# Patient Record
Sex: FEMALE | Race: ASIAN | URBAN - METROPOLITAN AREA
[De-identification: names, ages, dates, MRNs, and addresses within clinical notes are randomized per-mention and may not be internally consistent; named-entity substitution may affect disease eponyms.]

---

## 2023-07-05 ENCOUNTER — EMERGENCY (EMERGENCY)
Facility: HOSPITAL | Age: 48
LOS: 0 days | Discharge: HOME | End: 2023-07-05
Attending: EMERGENCY MEDICINE
Payer: MEDICAID

## 2023-07-05 VITALS
RESPIRATION RATE: 18 BRPM | TEMPERATURE: 98 F | OXYGEN SATURATION: 100 % | SYSTOLIC BLOOD PRESSURE: 145 MMHG | HEART RATE: 76 BPM | DIASTOLIC BLOOD PRESSURE: 88 MMHG

## 2023-07-05 VITALS
RESPIRATION RATE: 18 BRPM | SYSTOLIC BLOOD PRESSURE: 137 MMHG | DIASTOLIC BLOOD PRESSURE: 73 MMHG | TEMPERATURE: 97 F | OXYGEN SATURATION: 100 % | HEART RATE: 91 BPM

## 2023-07-05 DIAGNOSIS — E87.6 HYPOKALEMIA: ICD-10-CM

## 2023-07-05 DIAGNOSIS — Z86.79 PERSONAL HISTORY OF OTHER DISEASES OF THE CIRCULATORY SYSTEM: ICD-10-CM

## 2023-07-05 DIAGNOSIS — R42 DIZZINESS AND GIDDINESS: ICD-10-CM

## 2023-07-05 DIAGNOSIS — R20.0 ANESTHESIA OF SKIN: ICD-10-CM

## 2023-07-05 DIAGNOSIS — R00.2 PALPITATIONS: ICD-10-CM

## 2023-07-05 DIAGNOSIS — I10 ESSENTIAL (PRIMARY) HYPERTENSION: ICD-10-CM

## 2023-07-05 LAB
ALBUMIN SERPL ELPH-MCNC: 3.9 G/DL — SIGNIFICANT CHANGE UP (ref 3.3–5)
ALP SERPL-CCNC: 62 U/L — SIGNIFICANT CHANGE UP (ref 40–120)
ALT FLD-CCNC: 26 U/L — SIGNIFICANT CHANGE UP (ref 12–78)
ANION GAP SERPL CALC-SCNC: 9 MMOL/L — SIGNIFICANT CHANGE UP (ref 5–17)
AST SERPL-CCNC: 18 U/L — SIGNIFICANT CHANGE UP (ref 15–37)
BASOPHILS # BLD AUTO: 0.07 K/UL — SIGNIFICANT CHANGE UP (ref 0–0.2)
BASOPHILS NFR BLD AUTO: 1 % — SIGNIFICANT CHANGE UP (ref 0–2)
BILIRUB SERPL-MCNC: 1.1 MG/DL — SIGNIFICANT CHANGE UP (ref 0.2–1.2)
BUN SERPL-MCNC: 13 MG/DL — SIGNIFICANT CHANGE UP (ref 7–23)
CALCIUM SERPL-MCNC: 9.5 MG/DL — SIGNIFICANT CHANGE UP (ref 8.5–10.1)
CHLORIDE SERPL-SCNC: 109 MMOL/L — HIGH (ref 96–108)
CO2 SERPL-SCNC: 20 MMOL/L — LOW (ref 22–31)
CREAT SERPL-MCNC: 1.05 MG/DL — SIGNIFICANT CHANGE UP (ref 0.5–1.3)
EGFR: 65 ML/MIN/1.73M2 — SIGNIFICANT CHANGE UP
EOSINOPHIL # BLD AUTO: 0.07 K/UL — SIGNIFICANT CHANGE UP (ref 0–0.5)
EOSINOPHIL NFR BLD AUTO: 1 % — SIGNIFICANT CHANGE UP (ref 0–6)
GLUCOSE SERPL-MCNC: 113 MG/DL — HIGH (ref 70–99)
HCT VFR BLD CALC: 38.8 % — SIGNIFICANT CHANGE UP (ref 34.5–45)
HGB BLD-MCNC: 13.7 G/DL — SIGNIFICANT CHANGE UP (ref 11.5–15.5)
LYMPHOCYTES # BLD AUTO: 2.57 K/UL — SIGNIFICANT CHANGE UP (ref 1–3.3)
LYMPHOCYTES # BLD AUTO: 38 % — SIGNIFICANT CHANGE UP (ref 13–44)
MAGNESIUM SERPL-MCNC: 2 MG/DL — SIGNIFICANT CHANGE UP (ref 1.6–2.6)
MANUAL SMEAR VERIFICATION: SIGNIFICANT CHANGE UP
MCHC RBC-ENTMCNC: 27.8 PG — SIGNIFICANT CHANGE UP (ref 27–34)
MCHC RBC-ENTMCNC: 35.3 GM/DL — SIGNIFICANT CHANGE UP (ref 32–36)
MCV RBC AUTO: 78.7 FL — LOW (ref 80–100)
MICROCYTES BLD QL: SLIGHT — SIGNIFICANT CHANGE UP
MONOCYTES # BLD AUTO: 0.27 K/UL — SIGNIFICANT CHANGE UP (ref 0–0.9)
MONOCYTES NFR BLD AUTO: 4 % — SIGNIFICANT CHANGE UP (ref 2–14)
NEUTROPHILS # BLD AUTO: 3.79 K/UL — SIGNIFICANT CHANGE UP (ref 1.8–7.4)
NEUTROPHILS NFR BLD AUTO: 56 % — SIGNIFICANT CHANGE UP (ref 43–77)
NRBC # BLD: 0 /100 — SIGNIFICANT CHANGE UP (ref 0–0)
NRBC # BLD: SIGNIFICANT CHANGE UP /100 WBCS (ref 0–0)
PLAT MORPH BLD: NORMAL — SIGNIFICANT CHANGE UP
PLATELET # BLD AUTO: 277 K/UL — SIGNIFICANT CHANGE UP (ref 150–400)
POTASSIUM SERPL-MCNC: 3 MMOL/L — LOW (ref 3.5–5.3)
POTASSIUM SERPL-SCNC: 3 MMOL/L — LOW (ref 3.5–5.3)
PROT SERPL-MCNC: 8.5 GM/DL — HIGH (ref 6–8.3)
RBC # BLD: 4.93 M/UL — SIGNIFICANT CHANGE UP (ref 3.8–5.2)
RBC # FLD: 12.9 % — SIGNIFICANT CHANGE UP (ref 10.3–14.5)
RBC BLD AUTO: ABNORMAL
SODIUM SERPL-SCNC: 138 MMOL/L — SIGNIFICANT CHANGE UP (ref 135–145)
TROPONIN I, HIGH SENSITIVITY RESULT: 4.72 NG/L — SIGNIFICANT CHANGE UP
WBC # BLD: 6.76 K/UL — SIGNIFICANT CHANGE UP (ref 3.8–10.5)
WBC # FLD AUTO: 6.76 K/UL — SIGNIFICANT CHANGE UP (ref 3.8–10.5)

## 2023-07-05 PROCEDURE — 84484 ASSAY OF TROPONIN QUANT: CPT

## 2023-07-05 PROCEDURE — 71045 X-RAY EXAM CHEST 1 VIEW: CPT | Mod: 26

## 2023-07-05 PROCEDURE — 36415 COLL VENOUS BLD VENIPUNCTURE: CPT

## 2023-07-05 PROCEDURE — 93010 ELECTROCARDIOGRAM REPORT: CPT

## 2023-07-05 PROCEDURE — 83735 ASSAY OF MAGNESIUM: CPT

## 2023-07-05 PROCEDURE — 99285 EMERGENCY DEPT VISIT HI MDM: CPT | Mod: 25

## 2023-07-05 PROCEDURE — 93005 ELECTROCARDIOGRAM TRACING: CPT

## 2023-07-05 PROCEDURE — 80053 COMPREHEN METABOLIC PANEL: CPT

## 2023-07-05 PROCEDURE — 85025 COMPLETE CBC W/AUTO DIFF WBC: CPT

## 2023-07-05 PROCEDURE — 99285 EMERGENCY DEPT VISIT HI MDM: CPT

## 2023-07-05 PROCEDURE — 71045 X-RAY EXAM CHEST 1 VIEW: CPT

## 2023-07-05 PROCEDURE — 82962 GLUCOSE BLOOD TEST: CPT

## 2023-07-05 RX ORDER — POTASSIUM CHLORIDE 20 MEQ
40 PACKET (EA) ORAL ONCE
Refills: 0 | Status: COMPLETED | OUTPATIENT
Start: 2023-07-05 | End: 2023-07-05

## 2023-07-05 RX ORDER — SODIUM CHLORIDE 9 MG/ML
1000 INJECTION INTRAMUSCULAR; INTRAVENOUS; SUBCUTANEOUS ONCE
Refills: 0 | Status: COMPLETED | OUTPATIENT
Start: 2023-07-05 | End: 2023-07-05

## 2023-07-05 RX ADMIN — SODIUM CHLORIDE 1000 MILLILITER(S): 9 INJECTION INTRAMUSCULAR; INTRAVENOUS; SUBCUTANEOUS at 16:51

## 2023-07-05 RX ADMIN — Medication 40 MILLIEQUIVALENT(S): at 17:59

## 2023-07-05 NOTE — ED ADULT NURSE NOTE - OBJECTIVE STATEMENT
49 yo mandarin speaking female co palpitations, dizziness, weakness, bilateral numbness in upper extremities, and tachycardia that started at 1400. Pt denies chest pain and states this has never happened before. While drawing labs, pt states that she felt numbness in the legs. Pts extremities cold to touch, warm blanket was provided. Pt states that she takes high BP meds.  at bedside to translate.

## 2023-07-05 NOTE — ED PROVIDER NOTE - OBJECTIVE STATEMENT
49 y/o female w/PMHx of HTN presents to the ER with complaints of palpitations, dizziness, bilateral upper extremity numbness, and tachycardia. Patient mandarin speaking,  Jeferson requests to translate for patient. Patient sent by PCP for evaluation. Pt denies any chest pain.

## 2023-07-05 NOTE — ED ADULT NURSE NOTE - NS ED NURSE LEVEL OF CONSCIOUSNESS SPEECH
 Not on file   Social Needs    Financial resource strain: Not on file    Food insecurity     Worry: Not on file     Inability: Not on file    Transportation needs     Medical: Not on file     Non-medical: Not on file   Tobacco Use    Smoking status: Former Smoker     Packs/day: 0.25     Types: Cigarettes     Quit date: 2018     Years since quittin.8    Smokeless tobacco: Never Used   Substance and Sexual Activity    Alcohol use: Yes     Comment: Rare    Drug use: No    Sexual activity: Not on file   Lifestyle    Physical activity     Days per week: Not on file     Minutes per session: Not on file    Stress: Not on file   Relationships    Social connections     Talks on phone: Not on file     Gets together: Not on file     Attends Cheondoism service: Not on file     Active member of club or organization: Not on file     Attends meetings of clubs or organizations: Not on file     Relationship status: Not on file    Intimate partner violence     Fear of current or ex partner: Not on file     Emotionally abused: Not on file     Physically abused: Not on file     Forced sexual activity: Not on file   Other Topics Concern    Not on file   Social History Narrative    Not on file       Current Outpatient Medications   Medication Sig Dispense Refill    ibuprofen (ADVIL;MOTRIN) 600 MG tablet Take 1 tablet by mouth 3 times daily as needed for Pain 90 tablet 2    estradiol (ESTRACE) 0.5 MG tablet Take 0.5 mg by mouth daily      tiZANidine (ZANAFLEX) 2 MG tablet Take 2 tablets by mouth 3 times daily as needed (muscle pain) (Patient not taking: Reported on 2020) 60 tablet 0    FLUoxetine (PROZAC) 20 MG capsule Take 1 capsule by mouth daily (Patient not taking: Reported on 2020) 30 capsule 2    cloNIDine (CATAPRES) 0.1 MG tablet Take 1 tablet by mouth nightly (Patient not taking: Reported on 2020) 30 tablet 2    Probiotic Product (PROBIOTIC DAILY PO) Take by mouth
Speaking Coherently

## 2023-07-05 NOTE — ED ADULT NURSE NOTE - NSFALLUNIVINTERV_ED_ALL_ED
Bed/Stretcher in lowest position, wheels locked, appropriate side rails in place/Call bell, personal items and telephone in reach/Instruct patient to call for assistance before getting out of bed/chair/stretcher/Non-slip footwear applied when patient is off stretcher/[place] to call system/Physically safe environment - no spills, clutter or unnecessary equipment/Purposeful proactive rounding/Room/bathroom lighting operational, light cord in reach

## 2023-07-05 NOTE — ED ADULT TRIAGE NOTE - CHIEF COMPLAINT QUOTE
Patient presents to the ER with complaints of palpitations, dizziness, bilateral upper extremity numbness, and tachycardia. Patient mandarin speaking,  Jeferson requests to translate for patient. Patient sent by PCP for evaluation.

## 2023-07-05 NOTE — ED ADULT NURSE NOTE - CHIEF COMPLAINT QUOTE
Physical Exam  Mallampati: II  TM Distance: >3 FB  Neck ROM: Full  cardiovascular exam normal  pulmonary exam normal  abdominal exam normal      Anesthesia Plan  ASA Status: 3  Anesthesia Type: MAC  Reviewed: Lab Results, Pre-Induction Reassessment, Beta Blocker Status, NPO Status, Medications, Past Med History, Problem List, Allergies and EKG  The proposed anesthetic plan, including its risks and benefits, have been discussed with the Patient - along with the risks and benefits of alternatives.  Questions were encouraged and answered and the patient and/or representative agrees to proceed.  Blood Products: Not Anticipated      ROS/Med Hx   Patient presents to the ER with complaints of palpitations, dizziness, bilateral upper extremity numbness, and tachycardia. Patient mandarin speaking,  Jeferson requests to translate for patient. Patient sent by PCP for evaluation.

## 2023-07-05 NOTE — ED ADULT NURSE NOTE - NS_NURSE_DISC_TEACHING_YN_ED_ALL_ED
Refill requested for:     Lisinopril-hydrochlorothiazide and Levothyroxine 200mcg . Thyroid recheck in May.   Last filled on:    9/8/21 #90, 1 refill and 2/18/22    Last office visit:    10/22/21  Pending office visit None    Medication refilled per protocol.   Yes

## 2023-07-05 NOTE — ED PROVIDER NOTE - CLINICAL SUMMARY MEDICAL DECISION MAKING FREE TEXT BOX
EKG nonischemic.  Labs with hypokalemia. CXR WNL. NO neuro exam findings.  K repleted in ED.  IVF given as well.  Well on reevaluation.  D/c home with supportive care.

## 2023-07-05 NOTE — ED PROVIDER NOTE - NSFOLLOWUPINSTRUCTIONS_ED_ALL_ED_FT
Hypokalemia  Hypokalemia means that the amount of potassium in the blood is lower than normal. Potassium is a mineral (electrolyte) that helps regulate the amount of fluid in the body. It also stimulates muscle tightening (contraction) and helps nerves work properly.    Normally, most of the body's potassium is inside cells, and only a very small amount is in the blood. Because the amount in the blood is so small, minor changes to potassium levels in the blood can be life-threatening.    What are the causes?  This condition may be caused by:  Antibiotic medicine.  Diarrhea or vomiting. Taking too much of a medicine that helps you have a bowel movement (laxative) can cause diarrhea and lead to hypokalemia.  Chronic kidney disease (CKD).  Medicines that help the body get rid of excess fluid (diuretics).  Eating disorders, such as anorexia or bulimia.  Low magnesium levels in the body.  Sweating a lot.  What are the signs or symptoms?  Symptoms of this condition include:  Weakness.  Constipation.  Fatigue.  Muscle cramps.  Mental confusion.  Skipped heartbeats or irregular heartbeat (palpitations).  Tingling or numbness.  How is this diagnosed?  This condition is diagnosed with a blood test.    How is this treated?  This condition may be treated by:  Taking potassium supplements.  Adjusting the medicines that you take.  Eating more foods that contain a lot of potassium.  If your potassium level is very low, you may need to get potassium through an IV and be monitored in the hospital.    Follow these instructions at home:  Eating and drinking    A plate with examples of foods in a healthy diet.  Eat a healthy diet. A healthy diet includes fresh fruits and vegetables, whole grains, healthy fats, and lean proteins.  If told, eat more foods that contain a lot of potassium. These include:  Nuts, such as peanuts and pistachios.  Seeds, such as sunflower seeds and pumpkin seeds.  Peas, lentils, and lima beans.  Whole grain and bran cereals and breads.  Fresh fruits and vegetables, such as apricots, avocado, bananas, cantaloupe, kiwi, oranges, tomatoes, asparagus, and potatoes.  Juices, such as orange, tomato, and prune.  Lean meats, including fish.  Milk and milk products, such as yogurt.  General instructions    Take over-the-counter and prescription medicines only as told by your health care provider. This includes vitamins, natural food products, and supplements.  Keep all follow-up visits. This is important.  Contact a health care provider if:  You have weakness that gets worse.  You feel your heart pounding or racing.  You vomit.  You have diarrhea.  You have diabetes and you have trouble keeping your blood sugar in your target range.  Get help right away if:  You have chest pain.  You have shortness of breath.  You have vomiting or diarrhea that lasts for more than 2 days.  You faint.  These symptoms may be an emergency. Get help right away. Call 911.  Do not wait to see if the symptoms will go away.  Do not drive yourself to the hospital.  Summary  Hypokalemia means that the amount of potassium in the blood is lower than normal.  This condition is diagnosed with a blood test.  Hypokalemia may be treated by taking potassium supplements, adjusting the medicines that you take, or eating more foods that are high in potassium.  If your potassium level is very low, you may need to get potassium through an IV and be monitored in the hospital.  This information is not intended to replace advice given to you by your health care provider. Make sure you discuss any questions you have with your health care provider.    Document Revised: 09/01/2022 Document Reviewed: 09/01/2022

## 2023-07-16 RX ORDER — POTASSIUM GLUCONATE 2.5 MEQ
2 TABLET ORAL
Refills: 0 | DISCHARGE
Start: 2023-07-16

## 2023-07-17 ENCOUNTER — INPATIENT (INPATIENT)
Facility: HOSPITAL | Age: 48
LOS: 1 days | Discharge: HOME | DRG: 111 | End: 2023-07-19
Attending: HOSPITALIST | Admitting: INTERNAL MEDICINE
Payer: MEDICAID

## 2023-07-17 VITALS
TEMPERATURE: 98 F | HEART RATE: 77 BPM | RESPIRATION RATE: 18 BRPM | SYSTOLIC BLOOD PRESSURE: 151 MMHG | DIASTOLIC BLOOD PRESSURE: 71 MMHG | WEIGHT: 139.99 LBS | OXYGEN SATURATION: 100 % | HEIGHT: 66 IN

## 2023-07-17 DIAGNOSIS — R42 DIZZINESS AND GIDDINESS: ICD-10-CM

## 2023-07-17 LAB
ADD ON TEST-SPECIMEN IN LAB: SIGNIFICANT CHANGE UP
ALBUMIN SERPL ELPH-MCNC: 3.9 G/DL — SIGNIFICANT CHANGE UP (ref 3.3–5)
ALP SERPL-CCNC: 65 U/L — SIGNIFICANT CHANGE UP (ref 40–120)
ALT FLD-CCNC: 28 U/L — SIGNIFICANT CHANGE UP (ref 12–78)
ANION GAP SERPL CALC-SCNC: 7 MMOL/L — SIGNIFICANT CHANGE UP (ref 5–17)
APPEARANCE UR: CLEAR — SIGNIFICANT CHANGE UP
AST SERPL-CCNC: 15 U/L — SIGNIFICANT CHANGE UP (ref 15–37)
BASOPHILS # BLD AUTO: 0.07 K/UL — SIGNIFICANT CHANGE UP (ref 0–0.2)
BASOPHILS NFR BLD AUTO: 0.8 % — SIGNIFICANT CHANGE UP (ref 0–2)
BILIRUB SERPL-MCNC: 0.8 MG/DL — SIGNIFICANT CHANGE UP (ref 0.2–1.2)
BILIRUB UR-MCNC: NEGATIVE — SIGNIFICANT CHANGE UP
BUN SERPL-MCNC: 15 MG/DL — SIGNIFICANT CHANGE UP (ref 7–23)
CALCIUM SERPL-MCNC: 9.4 MG/DL — SIGNIFICANT CHANGE UP (ref 8.5–10.1)
CHLORIDE SERPL-SCNC: 110 MMOL/L — HIGH (ref 96–108)
CO2 SERPL-SCNC: 23 MMOL/L — SIGNIFICANT CHANGE UP (ref 22–31)
COLOR SPEC: YELLOW — SIGNIFICANT CHANGE UP
CREAT SERPL-MCNC: 0.96 MG/DL — SIGNIFICANT CHANGE UP (ref 0.5–1.3)
DIFF PNL FLD: NEGATIVE — SIGNIFICANT CHANGE UP
EGFR: 72 ML/MIN/1.73M2 — SIGNIFICANT CHANGE UP
EOSINOPHIL # BLD AUTO: 0.06 K/UL — SIGNIFICANT CHANGE UP (ref 0–0.5)
EOSINOPHIL NFR BLD AUTO: 0.7 % — SIGNIFICANT CHANGE UP (ref 0–6)
GLUCOSE SERPL-MCNC: 119 MG/DL — HIGH (ref 70–99)
GLUCOSE UR QL: NEGATIVE — SIGNIFICANT CHANGE UP
HCG SERPL-ACNC: <1 MIU/ML — SIGNIFICANT CHANGE UP
HCT VFR BLD CALC: 39.5 % — SIGNIFICANT CHANGE UP (ref 34.5–45)
HGB BLD-MCNC: 13.7 G/DL — SIGNIFICANT CHANGE UP (ref 11.5–15.5)
IMM GRANULOCYTES NFR BLD AUTO: 0.3 % — SIGNIFICANT CHANGE UP (ref 0–0.9)
KETONES UR-MCNC: ABNORMAL
LEUKOCYTE ESTERASE UR-ACNC: NEGATIVE — SIGNIFICANT CHANGE UP
LYMPHOCYTES # BLD AUTO: 1.21 K/UL — SIGNIFICANT CHANGE UP (ref 1–3.3)
LYMPHOCYTES # BLD AUTO: 13.4 % — SIGNIFICANT CHANGE UP (ref 13–44)
MAGNESIUM SERPL-MCNC: 2.1 MG/DL — SIGNIFICANT CHANGE UP (ref 1.6–2.6)
MCHC RBC-ENTMCNC: 27.6 PG — SIGNIFICANT CHANGE UP (ref 27–34)
MCHC RBC-ENTMCNC: 34.7 GM/DL — SIGNIFICANT CHANGE UP (ref 32–36)
MCV RBC AUTO: 79.6 FL — LOW (ref 80–100)
MONOCYTES # BLD AUTO: 0.37 K/UL — SIGNIFICANT CHANGE UP (ref 0–0.9)
MONOCYTES NFR BLD AUTO: 4.1 % — SIGNIFICANT CHANGE UP (ref 2–14)
NEUTROPHILS # BLD AUTO: 7.29 K/UL — SIGNIFICANT CHANGE UP (ref 1.8–7.4)
NEUTROPHILS NFR BLD AUTO: 80.7 % — HIGH (ref 43–77)
NITRITE UR-MCNC: NEGATIVE — SIGNIFICANT CHANGE UP
PH UR: 8 — SIGNIFICANT CHANGE UP (ref 5–8)
PLATELET # BLD AUTO: 299 K/UL — SIGNIFICANT CHANGE UP (ref 150–400)
POTASSIUM SERPL-MCNC: 3.4 MMOL/L — LOW (ref 3.5–5.3)
POTASSIUM SERPL-SCNC: 3.4 MMOL/L — LOW (ref 3.5–5.3)
PROT SERPL-MCNC: 8.4 GM/DL — HIGH (ref 6–8.3)
PROT UR-MCNC: NEGATIVE — SIGNIFICANT CHANGE UP
RBC # BLD: 4.96 M/UL — SIGNIFICANT CHANGE UP (ref 3.8–5.2)
RBC # FLD: 12.3 % — SIGNIFICANT CHANGE UP (ref 10.3–14.5)
SARS-COV-2 RNA SPEC QL NAA+PROBE: SIGNIFICANT CHANGE UP
SODIUM SERPL-SCNC: 140 MMOL/L — SIGNIFICANT CHANGE UP (ref 135–145)
SP GR SPEC: 1.01 — SIGNIFICANT CHANGE UP (ref 1.01–1.02)
TROPONIN I, HIGH SENSITIVITY RESULT: 4.61 NG/L — SIGNIFICANT CHANGE UP
UROBILINOGEN FLD QL: NEGATIVE — SIGNIFICANT CHANGE UP
WBC # BLD: 9.03 K/UL — SIGNIFICANT CHANGE UP (ref 3.8–10.5)
WBC # FLD AUTO: 9.03 K/UL — SIGNIFICANT CHANGE UP (ref 3.8–10.5)

## 2023-07-17 PROCEDURE — 84443 ASSAY THYROID STIM HORMONE: CPT

## 2023-07-17 PROCEDURE — 82746 ASSAY OF FOLIC ACID SERUM: CPT

## 2023-07-17 PROCEDURE — 80061 LIPID PANEL: CPT

## 2023-07-17 PROCEDURE — 97116 GAIT TRAINING THERAPY: CPT | Mod: GP

## 2023-07-17 PROCEDURE — 83735 ASSAY OF MAGNESIUM: CPT

## 2023-07-17 PROCEDURE — G0378: CPT

## 2023-07-17 PROCEDURE — 70551 MRI BRAIN STEM W/O DYE: CPT

## 2023-07-17 PROCEDURE — 82607 VITAMIN B-12: CPT

## 2023-07-17 PROCEDURE — 85025 COMPLETE CBC W/AUTO DIFF WBC: CPT

## 2023-07-17 PROCEDURE — 70547 MR ANGIOGRAPHY NECK W/O DYE: CPT

## 2023-07-17 PROCEDURE — 71045 X-RAY EXAM CHEST 1 VIEW: CPT | Mod: 26

## 2023-07-17 PROCEDURE — 93306 TTE W/DOPPLER COMPLETE: CPT

## 2023-07-17 PROCEDURE — 36415 COLL VENOUS BLD VENIPUNCTURE: CPT

## 2023-07-17 PROCEDURE — 99285 EMERGENCY DEPT VISIT HI MDM: CPT

## 2023-07-17 PROCEDURE — 84100 ASSAY OF PHOSPHORUS: CPT

## 2023-07-17 PROCEDURE — 80048 BASIC METABOLIC PNL TOTAL CA: CPT

## 2023-07-17 PROCEDURE — 70544 MR ANGIOGRAPHY HEAD W/O DYE: CPT

## 2023-07-17 PROCEDURE — 70450 CT HEAD/BRAIN W/O DYE: CPT | Mod: 26,MA

## 2023-07-17 PROCEDURE — 84702 CHORIONIC GONADOTROPIN TEST: CPT

## 2023-07-17 PROCEDURE — 83036 HEMOGLOBIN GLYCOSYLATED A1C: CPT

## 2023-07-17 PROCEDURE — 93010 ELECTROCARDIOGRAM REPORT: CPT

## 2023-07-17 PROCEDURE — 97162 PT EVAL MOD COMPLEX 30 MIN: CPT | Mod: GP

## 2023-07-17 RX ORDER — AMLODIPINE BESYLATE 2.5 MG/1
1 TABLET ORAL
Refills: 0 | DISCHARGE

## 2023-07-17 NOTE — ED ADULT NURSE NOTE - NSFALLRISKINTERV_ED_ALL_ED

## 2023-07-17 NOTE — ED ADULT TRIAGE NOTE - CHIEF COMPLAINT QUOTE
pt BIBEMS c/o weakness in all four extremities and dizziness since this AM. pt seen in  7/5 for the same symptoms and was told her potassium was low-3.0. pt states she was unable to walk at home. pt states the symptoms are exactly the same as last week.

## 2023-07-17 NOTE — ED PROVIDER NOTE - NS ED ROS FT
General: No fever, no nausea, no vomiting.  HEENT: No loc, no ha, +dizziness  Respiratory: no trouble breathing  Cardiovascular: No chest pain  GI: No abdominal pain, no diarrhea  : No urinary complaints  Endocrine: +generalized weakness  Neurological: No numbness.   MSK: no recent trauma

## 2023-07-17 NOTE — ED ADULT NURSE REASSESSMENT NOTE - NS ED NURSE REASSESS COMMENT FT1
VS STABLE. pt returned from CT. first contact with patient. pt spouse at bedside. pt reports she feels weak. LMP 7/6

## 2023-07-17 NOTE — ED PROVIDER NOTE - PHYSICAL EXAMINATION
General: Patient in no acute distress, AAOX3.   HENMT: NC/AT, no nasal congestion, MMM  Neck: supple  CVS: regular rate and rhythm, no murmur  Resp: Good air entry bilaterally, No wheeze/rhonchi.  Abd: Soft non tender, non distended, +bowel sounds, No guarding, rebound tenderness   Ext: FROM in all ext, 2+ pulses throughout, cap refill<2 sec.  NEURO: no focal deficit, gross motor and sensory intact throughout

## 2023-07-17 NOTE — ED PROVIDER NOTE - CLINICAL SUMMARY MEDICAL DECISION MAKING FREE TEXT BOX
47 y/o female, mandarin speaking w/no pertinent PMHx presents to ED c/o generalized weakness and dizziness since this morning. Extremities feel worse with generalized weakness. will rule out any ICH vs any electrolyte abnormalities vs arrythmia. plan to do labs, EKG, and reassess. 49 y/o female, mandarin speaking w/no pertinent PMHx presents to ED c/o generalized weakness and dizziness since this morning. Extremities feel worse with generalized weakness. will rule out any ICH vs ischemia vs electrolyte abnormalities vs arrythmia. plan to do labs, EKG, imaging and reassess.

## 2023-07-17 NOTE — ED PROVIDER NOTE - OBJECTIVE STATEMENT
47 y/o female, mandarin speaking w/pertinent PMHx presents to ED c/o generalized weakness and dizziness since this morning. States that extremities feel worse with generalized weakness. Pt was seen at  7/5 for similar symptoms and was told her potassium was low-3.0. No fevers, n/v/d, headaches, or abdominal pain. Endorses dehydration at this time. Denies dysuria. No numbness or tingling of extremities. LMP 7/6/2023.

## 2023-07-17 NOTE — ED ADULT NURSE NOTE - OBJECTIVE STATEMENT
Pt presents to the ER c/o dizziness, weakness in all 4 limbs and lethargy starting this morning. Reports she is unable to ambulate because she is too weak. Recently discharged from the ER approx a week ago with the same symptoms and reports she had low potassium levels. Denies CP, SOB, nausea, vomiting. No distress noted.

## 2023-07-17 NOTE — PHARMACOTHERAPY INTERVENTION NOTE - COMMENTS
Medication reconciliation completed.  Reviewed Medication list and confirmed med allergies with patient and pt's  Jeferson at bedside; confirmed with Dr. Amaya MedHx.  Pt confirms that she has Asthma and an inhaler for emergencies at home, but cannot confirm what medication is in the inhaler, no record in Dr. First of an Inhaler.

## 2023-07-18 ENCOUNTER — TRANSCRIPTION ENCOUNTER (OUTPATIENT)
Age: 48
End: 2023-07-18

## 2023-07-18 DIAGNOSIS — Z78.9 OTHER SPECIFIED HEALTH STATUS: Chronic | ICD-10-CM

## 2023-07-18 LAB
A1C WITH ESTIMATED AVERAGE GLUCOSE RESULT: 5.3 % — SIGNIFICANT CHANGE UP (ref 4–5.6)
ANION GAP SERPL CALC-SCNC: 6 MMOL/L — SIGNIFICANT CHANGE UP (ref 5–17)
BASOPHILS # BLD AUTO: 0.04 K/UL — SIGNIFICANT CHANGE UP (ref 0–0.2)
BASOPHILS NFR BLD AUTO: 0.5 % — SIGNIFICANT CHANGE UP (ref 0–2)
BUN SERPL-MCNC: 16 MG/DL — SIGNIFICANT CHANGE UP (ref 7–23)
CALCIUM SERPL-MCNC: 8.9 MG/DL — SIGNIFICANT CHANGE UP (ref 8.5–10.1)
CHLORIDE SERPL-SCNC: 108 MMOL/L — SIGNIFICANT CHANGE UP (ref 96–108)
CHOLEST SERPL-MCNC: 192 MG/DL — SIGNIFICANT CHANGE UP
CO2 SERPL-SCNC: 26 MMOL/L — SIGNIFICANT CHANGE UP (ref 22–31)
CREAT SERPL-MCNC: 0.91 MG/DL — SIGNIFICANT CHANGE UP (ref 0.5–1.3)
CULTURE RESULTS: SIGNIFICANT CHANGE UP
EGFR: 77 ML/MIN/1.73M2 — SIGNIFICANT CHANGE UP
EOSINOPHIL # BLD AUTO: 0.19 K/UL — SIGNIFICANT CHANGE UP (ref 0–0.5)
EOSINOPHIL NFR BLD AUTO: 2.5 % — SIGNIFICANT CHANGE UP (ref 0–6)
ESTIMATED AVERAGE GLUCOSE: 105 MG/DL — SIGNIFICANT CHANGE UP (ref 68–114)
FOLATE SERPL-MCNC: 18.8 NG/ML — SIGNIFICANT CHANGE UP
GLUCOSE SERPL-MCNC: 111 MG/DL — HIGH (ref 70–99)
HCG SERPL-ACNC: <1 MIU/ML — SIGNIFICANT CHANGE UP
HCT VFR BLD CALC: 37.1 % — SIGNIFICANT CHANGE UP (ref 34.5–45)
HDLC SERPL-MCNC: 41 MG/DL — LOW
HGB BLD-MCNC: 12.6 G/DL — SIGNIFICANT CHANGE UP (ref 11.5–15.5)
IMM GRANULOCYTES NFR BLD AUTO: 0.4 % — SIGNIFICANT CHANGE UP (ref 0–0.9)
LIPID PNL WITH DIRECT LDL SERPL: 132 MG/DL — HIGH
LYMPHOCYTES # BLD AUTO: 2.61 K/UL — SIGNIFICANT CHANGE UP (ref 1–3.3)
LYMPHOCYTES # BLD AUTO: 34.4 % — SIGNIFICANT CHANGE UP (ref 13–44)
MAGNESIUM SERPL-MCNC: 2.2 MG/DL — SIGNIFICANT CHANGE UP (ref 1.6–2.6)
MCHC RBC-ENTMCNC: 27.6 PG — SIGNIFICANT CHANGE UP (ref 27–34)
MCHC RBC-ENTMCNC: 34 GM/DL — SIGNIFICANT CHANGE UP (ref 32–36)
MCV RBC AUTO: 81.2 FL — SIGNIFICANT CHANGE UP (ref 80–100)
MONOCYTES # BLD AUTO: 0.52 K/UL — SIGNIFICANT CHANGE UP (ref 0–0.9)
MONOCYTES NFR BLD AUTO: 6.9 % — SIGNIFICANT CHANGE UP (ref 2–14)
NEUTROPHILS # BLD AUTO: 4.2 K/UL — SIGNIFICANT CHANGE UP (ref 1.8–7.4)
NEUTROPHILS NFR BLD AUTO: 55.3 % — SIGNIFICANT CHANGE UP (ref 43–77)
NON HDL CHOLESTEROL: 151 MG/DL — HIGH
PHOSPHATE SERPL-MCNC: 4.9 MG/DL — HIGH (ref 2.5–4.5)
PLATELET # BLD AUTO: 264 K/UL — SIGNIFICANT CHANGE UP (ref 150–400)
POTASSIUM SERPL-MCNC: 3.3 MMOL/L — LOW (ref 3.5–5.3)
POTASSIUM SERPL-SCNC: 3.3 MMOL/L — LOW (ref 3.5–5.3)
RBC # BLD: 4.57 M/UL — SIGNIFICANT CHANGE UP (ref 3.8–5.2)
RBC # FLD: 12.8 % — SIGNIFICANT CHANGE UP (ref 10.3–14.5)
SODIUM SERPL-SCNC: 140 MMOL/L — SIGNIFICANT CHANGE UP (ref 135–145)
SPECIMEN SOURCE: SIGNIFICANT CHANGE UP
TRIGL SERPL-MCNC: 102 MG/DL — SIGNIFICANT CHANGE UP
TSH SERPL-MCNC: 3.24 UU/ML — SIGNIFICANT CHANGE UP (ref 0.34–4.82)
VIT B12 SERPL-MCNC: 637 PG/ML — SIGNIFICANT CHANGE UP (ref 232–1245)
WBC # BLD: 7.59 K/UL — SIGNIFICANT CHANGE UP (ref 3.8–10.5)
WBC # FLD AUTO: 7.59 K/UL — SIGNIFICANT CHANGE UP (ref 3.8–10.5)

## 2023-07-18 PROCEDURE — 93306 TTE W/DOPPLER COMPLETE: CPT | Mod: 26

## 2023-07-18 PROCEDURE — 99223 1ST HOSP IP/OBS HIGH 75: CPT

## 2023-07-18 PROCEDURE — 70547 MR ANGIOGRAPHY NECK W/O DYE: CPT | Mod: 26

## 2023-07-18 PROCEDURE — 70544 MR ANGIOGRAPHY HEAD W/O DYE: CPT | Mod: 26,59

## 2023-07-18 PROCEDURE — 99497 ADVNCD CARE PLAN 30 MIN: CPT | Mod: 25

## 2023-07-18 PROCEDURE — 70551 MRI BRAIN STEM W/O DYE: CPT | Mod: 26

## 2023-07-18 RX ORDER — ALBUTEROL 90 UG/1
2 AEROSOL, METERED ORAL
Refills: 0 | DISCHARGE

## 2023-07-18 RX ORDER — ALBUTEROL 90 UG/1
2 AEROSOL, METERED ORAL EVERY 6 HOURS
Refills: 0 | Status: DISCONTINUED | OUTPATIENT
Start: 2023-07-18 | End: 2023-07-19

## 2023-07-18 RX ORDER — MECLIZINE HCL 12.5 MG
25 TABLET ORAL EVERY 8 HOURS
Refills: 0 | Status: DISCONTINUED | OUTPATIENT
Start: 2023-07-18 | End: 2023-07-19

## 2023-07-18 RX ORDER — POTASSIUM CHLORIDE 20 MEQ
1 PACKET (EA) ORAL
Qty: 30 | Refills: 0
Start: 2023-07-18 | End: 2023-08-16

## 2023-07-18 RX ORDER — ENOXAPARIN SODIUM 100 MG/ML
40 INJECTION SUBCUTANEOUS EVERY 24 HOURS
Refills: 0 | Status: DISCONTINUED | OUTPATIENT
Start: 2023-07-18 | End: 2023-07-19

## 2023-07-18 RX ORDER — ATORVASTATIN CALCIUM 80 MG/1
40 TABLET, FILM COATED ORAL AT BEDTIME
Refills: 0 | Status: DISCONTINUED | OUTPATIENT
Start: 2023-07-18 | End: 2023-07-19

## 2023-07-18 RX ORDER — POTASSIUM CHLORIDE 20 MEQ
20 PACKET (EA) ORAL DAILY
Refills: 0 | Status: DISCONTINUED | OUTPATIENT
Start: 2023-07-18 | End: 2023-07-19

## 2023-07-18 RX ORDER — POTASSIUM CHLORIDE 20 MEQ
40 PACKET (EA) ORAL ONCE
Refills: 0 | Status: DISCONTINUED | OUTPATIENT
Start: 2023-07-18 | End: 2023-07-19

## 2023-07-18 RX ORDER — ASPIRIN/CALCIUM CARB/MAGNESIUM 324 MG
81 TABLET ORAL DAILY
Refills: 0 | Status: DISCONTINUED | OUTPATIENT
Start: 2023-07-18 | End: 2023-07-19

## 2023-07-18 RX ORDER — ONDANSETRON 8 MG/1
4 TABLET, FILM COATED ORAL EVERY 8 HOURS
Refills: 0 | Status: DISCONTINUED | OUTPATIENT
Start: 2023-07-18 | End: 2023-07-19

## 2023-07-18 RX ORDER — LANOLIN ALCOHOL/MO/W.PET/CERES
3 CREAM (GRAM) TOPICAL AT BEDTIME
Refills: 0 | Status: DISCONTINUED | OUTPATIENT
Start: 2023-07-18 | End: 2023-07-19

## 2023-07-18 RX ORDER — ACETAMINOPHEN 500 MG
650 TABLET ORAL EVERY 6 HOURS
Refills: 0 | Status: DISCONTINUED | OUTPATIENT
Start: 2023-07-18 | End: 2023-07-19

## 2023-07-18 RX ORDER — AMLODIPINE BESYLATE 2.5 MG/1
5 TABLET ORAL DAILY
Refills: 0 | Status: DISCONTINUED | OUTPATIENT
Start: 2023-07-18 | End: 2023-07-19

## 2023-07-18 RX ADMIN — Medication 3 MILLIGRAM(S): at 21:12

## 2023-07-18 RX ADMIN — Medication 20 MILLIEQUIVALENT(S): at 09:49

## 2023-07-18 RX ADMIN — ATORVASTATIN CALCIUM 40 MILLIGRAM(S): 80 TABLET, FILM COATED ORAL at 21:12

## 2023-07-18 RX ADMIN — AMLODIPINE BESYLATE 5 MILLIGRAM(S): 2.5 TABLET ORAL at 09:49

## 2023-07-18 RX ADMIN — Medication 81 MILLIGRAM(S): at 09:50

## 2023-07-18 RX ADMIN — ENOXAPARIN SODIUM 40 MILLIGRAM(S): 100 INJECTION SUBCUTANEOUS at 09:51

## 2023-07-18 NOTE — PHYSICAL THERAPY INITIAL EVALUATION ADULT - ASSISTIVE DEVICE FOR STAIR TRANSFER, REHAB EVAL
[Behavioral health counseling provided] : Behavioral health counseling provided
right rail up/left rail down

## 2023-07-18 NOTE — H&P ADULT - NSHPPHYSICALEXAM_GEN_ALL_CORE
ICU Vital Signs Last 24 Hrs  T(C): 36.9 (18 Jul 2023 03:05), Max: 37.3 (17 Jul 2023 21:15)  T(F): 98.5 (18 Jul 2023 03:05), Max: 99.2 (17 Jul 2023 21:15)  HR: 71 (18 Jul 2023 03:05) (71 - 77)  BP: 139/83 (18 Jul 2023 03:05) (139/83 - 152/93)  BP(mean): 109 (17 Jul 2023 23:41) (109 - 109)  RR: 18 (18 Jul 2023 03:05) (16 - 19)  SpO2: 99% (18 Jul 2023 03:05) (98% - 100%)    O2 Parameters below as of 18 Jul 2023 03:05  Patient On (Oxygen Delivery Method): room air    General: Awake and alert, cooperative with exam. No acute distress.   Skin: Warm, dry, and pink.   Eyes: Pupils equal and reactive to light. Extraocular eye movements intact. No conjunctival injection, discharge, or scleral icterus.   HEENT: Atraumatic, normocephalic. Moist mucus membranes.  Cardiology: Normal S1, S2. No murmurs, rubs, or gallops. Regular rate and rhythm.   Respiratory: Lungs clear to ascultation bilaterally. Good air exchange. No wheezes, rales, or rhonchi. Normal chest expansion.   Gastrointestinal: Positive bowel sounds. Soft, non-tender, non-distended. No guarding, rigidity, or rebound tenderness. No hepatosplenomegaly.   Musculoskeletal: 5/5 motor strength in all extremities. Normal range of motion.   Extremities: No peripheral edema bilaterally. Dorsalis pedis pulses 2+ bilaterally.   Neurological: A+Ox3 (person, place, and time). Cranial nerves 2-12 intact. Normal speech. No facial droop. No focal neurological deficits. 5/5 motor strength in all extremities. Sensation intact. Negative Cyndi Hallpike.   Psychiatric: Normal affect. Normal mood.

## 2023-07-18 NOTE — PHYSICAL THERAPY INITIAL EVALUATION ADULT - PERTINENT HX OF CURRENT PROBLEM, REHAB EVAL
49 yo F admitted c/o  dizziness. Pt states that she had a few episodes over the last few weeks. States that this morning was the third time this occurred. She woke up and felt dizzy and then started to have numbness and tingling in her hands and feet.  Does not have a spinning sensation.  CT head (-). MRI head ordered.

## 2023-07-18 NOTE — PATIENT PROFILE ADULT - FALL HARM RISK - UNIVERSAL INTERVENTIONS
Bed in lowest position, wheels locked, appropriate side rails in place/Call bell, personal items and telephone in reach/Instruct patient to call for assistance before getting out of bed or chair/Non-slip footwear when patient is out of bed/[place] to call system/Physically safe environment - no spills, clutter or unnecessary equipment/Purposeful Proactive Rounding/Room/bathroom lighting operational, light cord in reach

## 2023-07-18 NOTE — DISCHARGE NOTE PROVIDER - PROVIDER TOKENS
PROVIDER:[TOKEN:[[id number]],FOLLOWUP:[1 week],ESTABLISHEDPATIENT:[T]],FREE:[LAST:[Primary Medical Doctor],PHONE:[(   )    -],FAX:[(   )    -],FOLLOWUP:[1 week],ESTABLISHEDPATIENT:[T]]

## 2023-07-18 NOTE — H&P ADULT - ASSESSMENT
49 y/o F presents with dizziness, paresthesias     1. Dizziness and paresthesias likely secondary to vertigo vs. hypokalemic periodic paralysis? vs. B12/folate deficiency? (r/o CVA, MS)   - Admit to telemetry    - Not a candidate for TPA; NIH 0 upon my evaluation    - CT head: no acute intracranial pathology  - Ordered MRI brain, MRA head/neck, lipid panel, HbA1c, ECHO, B12, folate, TSH, Mg, Phosphorous   - Neuro checks Q4H  - Passed dysphagia evaluation -> DASH diet   - Blood glucose checks Q6H   - Start aspirin 81 mg daily and atorvastatin 80 mg daily  - PT evaluation  - Trial of Meclizine, Zofran for nausea   - Neurology consult - Dr. Aranda     2. Hypokalemia   - K+ 3.4, monitor closely     3. History of HTN, asthma  - c/w home medications; verified with pt at the bedside    DVT ppx: Lovenox 40 mg subcutaneous daily   Code status: Full code   Emergency contact: Jeferson Spaulding ()     I spent a total of 75 minutes on the date of this encounter coordinating the patient's care. This includes reviewing prior documentation, results and imaging in addition to completing a full history and physical examination on the patient. Further tests, medications, and procedures have been ordered as indicated. Laboratory results and the plan of care were communicated to the patient and/or their family member. Supporting documentation was completed and added to the patient's chart.

## 2023-07-18 NOTE — PHYSICAL THERAPY INITIAL EVALUATION ADULT - DIAGNOSIS, PT EVAL
Dizziness and paresthesias likely secondary to vertigo vs. hypokalemic periodic paralysis? vs. B12/folate deficiency? (r/o CVA, MS)

## 2023-07-18 NOTE — PHYSICAL THERAPY INITIAL EVALUATION ADULT - GENERAL OBSERVATIONS, REHAB EVAL
Patient received in bed on 3N, +HM.  Spouse and son in room. Consent to speak given.  Patient denied pain, C/O lightheadedness.  BP: 149/94.  Session held until BP meds given.

## 2023-07-18 NOTE — CONSULT NOTE ADULT - SUBJECTIVE AND OBJECTIVE BOX
CC: 48 y old  Female who presents with a chief complaint of Dizziness, paresthesias     HPI:  49 y/o F with PMH HTN, asthma presented to ED with c/o dizziness. Pt states that she woke up this morning, felt dizzy - no associated spinning sensation, diplopia, nausea, vomiting ot tinnius, soon after started to have numbness and tingling in her hands and feet and whole body felt weak. No LOC or focal weakness or ataxia. She reports that she had an episodes of similar nature 2 weeks ago, was seen in the ER on 7/5/23, was diagnosed with hypokalemia, she f/u with her PMD has been given K+ supplements Her whole body feels weak.     Pt denies HA, recent fevers, chills, URI, tick or bug bites.      CT head: No acute intracranial pathology. Inflammatory changes appreciated within the partially imaged right maxillary sinus. K 3.3      PAST MEDICAL & SURGICAL HISTORY:  Hypertension  Asthma      FAMILY HISTORY:  Known health problems: none (Father, Mother)      Social Hx:  Nonsmoker, no drug or alcohol use      MEDICATIONS  (STANDING):  amLODIPine   Tablet 5 milliGRAM(s) Oral daily  aspirin enteric coated 81 milliGRAM(s) Oral daily  atorvastatin 40 milliGRAM(s) Oral at bedtime  enoxaparin Injectable 40 milliGRAM(s) SubCutaneous every 24 hours  potassium chloride    Tablet ER 20 milliEquivalent(s) Oral daily  potassium chloride   Powder 40 milliEquivalent(s) Oral once       Allergies  No Known Allergies      ROS: Pertinent positives in HPI, all other ROS were reviewed and are negative.        Vital Signs Last 24 Hrs  T(C): 36.7 (18 Jul 2023 16:02), Max: 37.3 (17 Jul 2023 21:15)  T(F): 98.1 (18 Jul 2023 16:02), Max: 99.2 (17 Jul 2023 21:15)  HR: 84 (18 Jul 2023 16:02) (70 - 84)  BP: 129/80 (18 Jul 2023 16:02) (129/80 - 152/93)  BP(mean): 109 (17 Jul 2023 23:41) (109 - 109)  RR: 18 (18 Jul 2023 16:02) (16 - 19)  SpO2: 98% (18 Jul 2023 16:02) (98% - 100%)    Parameters below as of 18 Jul 2023 16:02  Patient On (Oxygen Delivery Method): room air      Gen exam:  Normocephalic, in no distress, awake and alert.  HEENT: PERRLA, EOMI,   Neck: Supple.  Respiratory: Breath sounds are clear bilaterally  Cardiovascular: S1 and S2, regular   Extremities:  no edema  Vascular: Caritid Bruit - no  Musculoskeletal: no joint swelling/tenderness, no abnormal movements  Skin: No rashes      Neurological exam:  HF: A x O x 3. Appropriately interactive, normal affect. Speech fluent, No Aphasia or paraphasic errors. Naming /repetition intact   CN: SONALI, EOMI, VFF, facial sensation normal, no NLFD, tongue midline, Palate moves equally, SCM equal bilaterally  Motor: No pronator drift, Strength 5/5 in all 4 ext, normal bulk and tone, no tremor, rigidity   Sens: Intact to light touch / PP   Reflexes: Symmetric and normal, downgoing toes b/l  Coord:  No FNFA, dysmetria, COLLIN intact   Gait/Balance: Normal          Labs:   07-18    140  |  108  |  16  ----------------------------<  111<H>  3.3<L>   |  26  |  0.91    Ca    8.9      18 Jul 2023 07:41  Phos  4.9     07-18  Mg     2.2     07-18    TPro  8.4<H>  /  Alb  3.9  /  TBili  0.8  /  DBili  x   /  AST  15  /  ALT  28  /  AlkPhos  65  07-17    07-18 Chol 192 LDL -- HDL 41<L> Trig 102                          12.6   7.59  )-----------( 264      ( 18 Jul 2023 07:41 )             37.1       Radiology:  < from: CT Head No Cont (07.17.23 @ 19:16) >  IMPRESSION:  1. No acute intracranial pathology. If dizziness persists, consider   further evaluation via MR imaging to include DWI and ADC mapping   techniques, provided there are no contraindications.  2. Inflammatory changes appreciated within the partially imaged right   maxillary sinus.

## 2023-07-18 NOTE — DISCHARGE NOTE PROVIDER - HOSPITAL COURSE
Reason for Admission: Dizziness, paresthesias    Patient is a 49 y/o F with PMH HTN, asthma presents with dizziness. Pt states that she had a few episodes over the last few weeks. States that this morning was the third time this occurred. She woke up and felt dizzy and then started to have numbness and tingling in her hands and feet. Does not have a spinning sensation. Her whole body feels weak. Does not notice any association with movement. She was seen in the ER on 7/5/23 and was diagnosed with hypokalemia and discharged home. Denies fevers, chills, chest pain, SOB, abdominal pain, N/V, diarrhea/constipation.      Imaging:   - CT head: 1. No acute intracranial pathology. If dizziness persists, consider further evaluation via MR imaging to include DWI and ADC mapping techniques, provided there are no contraindications. 2. Inflammatory changes appreciated within the partially imaged right maxillary sinus.  - CXR: no consolidation, no effusion, no pneumothorax (personally reviewed).     Pt is being admitted to telemetry for further management.     Patient currently has no symptoms. comfortable. Ambulating. No further symptoms.     Physical Exam:   GENERAL APPEARANCE:  NAD, hemodynamically stable  T(C): 36.8 (07-18-23 @ 08:31), Max: 37.3 (07-17-23 @ 21:15)  HR: 70 (07-18-23 @ 08:31) (70 - 77)  BP: 141/83 (07-18-23 @ 08:31) (139/83 - 152/93)  RR: 18 (07-18-23 @ 08:31) (16 - 19)  SpO2: 99% (07-18-23 @ 08:31) (98% - 100%)  Wt(kg): --  HEENT:  Head is normocephalic    Skin:  Warm and dry without any rash   NECK:  Supple without lymphadenopathy.   HEART:  Regular rate and rhythm. normal S1 and S2, No M/R/G  LUNGS:  Good ins/exp effort, no W/R/R/C  ABDOMEN:  Soft, nontender, nondistended with good bowel sounds heard  EXTREMITIES:  Without cyanosis, clubbing or edema.   NEUROLOGICAL:  Gross nonfocal Reason for Admission: Dizziness, paresthesias    Patient is a 47 y/o F with PMH HTN, asthma presents with dizziness. Pt states that she had a few episodes over the last few weeks. States that this morning was the third time this occurred. She woke up and felt dizzy and then started to have numbness and tingling in her hands and feet. Does not have a spinning sensation. Her whole body feels weak. Does not notice any association with movement. She was seen in the ER on 7/5/23 and was diagnosed with hypokalemia and discharged home. Denies fevers, chills, chest pain, SOB, abdominal pain, N/V, diarrhea/constipation.      Imaging:   - CT head: 1. No acute intracranial pathology. If dizziness persists, consider further evaluation via MR imaging to include DWI and ADC mapping techniques, provided there are no contraindications. 2. Inflammatory changes appreciated within the partially imaged right maxillary sinus.  - CXR: no consolidation, no effusion, no pneumothorax (personally reviewed).     Pt is being admitted to telemetry for further management. No events  MR negative for acute process.    Patient currently has no symptoms. comfortable. Ambulating. No further symptoms.     Physical Exam:   T(C): 36.6 (07-19-23 @ 08:20), Max: 36.7 (07-18-23 @ 16:02)  HR: 62 (07-19-23 @ 08:20) (62 - 84)  BP: 132/81 (07-19-23 @ 08:20) (129/80 - 132/81)  RR: 18 (07-19-23 @ 08:20) (18 - 18)  SpO2: 98% (07-19-23 @ 08:20) (97% - 98%)  GENERAL APPEARANCE:  NAD, hemodynamically stable  HEENT:  Head is normocephalic    Skin:  Warm and dry without any rash   NECK:  Supple without lymphadenopathy.   HEART:  Regular rate and rhythm. normal S1 and S2, No M/R/G  LUNGS:  Good ins/exp effort, no W/R/R/C  ABDOMEN:  Soft, nontender, nondistended with good bowel sounds heard  EXTREMITIES:  Without cyanosis, clubbing or edema.   NEUROLOGICAL:  Gross nonfocal   Discharge Management 38 minutes  Date of Discharge/Service: 7/19/2023

## 2023-07-18 NOTE — H&P ADULT - NSHPREVIEWOFSYSTEMS_GEN_ALL_CORE
Constitutional: negative for fatigue, negative for fever, negative for chills, negative for decreased appetite.  Skin: negative for rashes, negative for open wounds, negative for jaundice.   Eyes: negative for blurry vision, negative for double vision.   Ears, nose, throat: negative for ear pain, negative for nasal congestion, negative for sore throat, negative for lymph node swelling.   Cardiovascular: negative for chest pain, negative for palpitations, negative for lower extremity swelling.   Respiratory: negative for shortness of breath, negative for wheezing, negative for cough.   Gastrointestinal: negative for abdominal pain, negative for nausea, negative for vomiting, negative for diarrhea, negative for constipation, negative for blood in the stool, negative for black tarry stools.   Genitourinary: negative for burning on urination, negative for urinary urgency or frequency, negative for blood in the urine.   Endocrine: negative for cold intolerance, negative for heat intolerance, negative for increased thirst.   Hematologic: negative for easy bruising or bleeding.   Musculoskeletal: negative for muscle/joint pain, negative for decreased range of motion.   Neurological: positive for dizziness, negative for headaches, negative for loss of consciousness, negative for motor weakness, positive for sensory deficits.   Psychiatric: negative for depression, negative for anxiety.

## 2023-07-18 NOTE — DISCHARGE NOTE PROVIDER - NSDCCPCAREPLAN_GEN_ALL_CORE_FT
PRINCIPAL DISCHARGE DIAGNOSIS  Diagnosis: Lightheadedness  Assessment and Plan of Treatment: # this could be secondary to dehydration /  taking over the counter potassium supplement  - telemetry without any arrhythmias, normal sinus in the 70s  - Not a candidate for TPA; NIH 0 upon my evaluation    - CT head: no acute intracranial pathology  - regular diet  - Blood glucose checks Q6H   - PT evaluation  - Trial of Meclizine, Zofran for nausea   - Neurology consult - Dr. Aranda        PRINCIPAL DISCHARGE DIAGNOSIS  Diagnosis: Lightheadedness  Assessment and Plan of Treatment: Underwent evaluation. MRI and CT imaging of the brain was unremarkable for any acute or concerning process.   You went >12 hours prior to this most recent episodes without any eating or drinking/caloric intake. Recommend small frequency meals to avoid episodes of low blood sugar and less energy supply for the body which can provoke symptoms of weakness, lightheadedness and other neurologic symptoms.   If symptoms recur, may benefit from referral from your primary care doctor to cardiologist (heart doctor) to arrange a heart monitor to monitor your heart over times and capture any possible heart events when you do have symptoms.   Also if your snore at night or have trouble sleeping and also have day time sleepiness and fatigue, recommend following up with your primary medical doctor to arrange for sleep study to evaluate for underlying sleep apnea.         SECONDARY DISCHARGE DIAGNOSES  Diagnosis: Hypokalemia  Assessment and Plan of Treatment: Slightly low potassium level in your blood. Most common cause of this is prolonged times of decreased adequate oral nutrition/intake. Can continue to take prescribed potassium supplementation or increased oral nutrition with food high in potassium (example: banana). Follow up with your primary medical doctor for repeat blood work and continued evaluation.

## 2023-07-18 NOTE — H&P ADULT - HISTORY OF PRESENT ILLNESS
47 y/o F with PMH HTN, asthma presents with dizziness. Pt states that she had a few episodes over the last few weeks. States that this morning was the third time this occurred. She woke up and felt dizzy and then started to have numbness and tingling in her hands and feet. Does not have a spinning sensation. Her whole body feels weak. Does not notice any association with movement. She was seen in the ER on 7/5/23 and was diagnosed with hypokalemia and discharged home. Denies fevers, chills, chest pain, SOB, abdominal pain, N/V, diarrhea/constipation.      ER course: VSS. Labs: neutrophils 80.7%, K+ 3.4, glucose 119. UA: large ketones. COVID negative.     EKG: pending, f/u result     Imaging:   - CT head: 1. No acute intracranial pathology. If dizziness persists, consider further evaluation via MR imaging to include DWI and ADC mapping techniques, provided there are no contraindications. 2. Inflammatory changes appreciated within the partially imaged right maxillary sinus.  - CXR: no consolidation, no effusion, no pneumothorax (personally reviewed).     Pt is being admitted to telemetry for further management.

## 2023-07-18 NOTE — DISCHARGE NOTE PROVIDER - NSDCMRMEDTOKEN_GEN_ALL_CORE_FT
Albuterol (Eqv-Proventil HFA) 90 mcg/inh inhalation aerosol: 2 puff(s) inhaled 2 times a day  amLODIPine 5 mg oral tablet: 1 tab(s) orally once a day  potassium chloride 20 mEq oral tablet, extended release: 1 tab(s) orally once a day

## 2023-07-18 NOTE — PHYSICAL THERAPY INITIAL EVALUATION ADULT - MODALITIES TREATMENT COMMENTS
No c/o dizziness throughout session.  BP: 135/84.  Patient returned to bed by request, call bell in reach and bed alarm active. Patient independent in functional mobility, no skilled physical therapy need in this setting.  May ambulate per nursing.  Will d/c from PT. RN, CM informed.

## 2023-07-18 NOTE — DISCHARGE NOTE PROVIDER - CARE PROVIDER_API CALL
Chandler Packer  Gastroenterology  [phone] [place] [address], [address] [place] [address]  Phone: [phone]  Fax: [phone]  Established Patient  Follow Up Time: 1 week    Primary Medical Doctor,   Phone: (   )    -  Fax: (   )    -  Established Patient  Follow Up Time: 1 week

## 2023-07-19 ENCOUNTER — TRANSCRIPTION ENCOUNTER (OUTPATIENT)
Age: 48
End: 2023-07-19

## 2023-07-19 VITALS
HEART RATE: 62 BPM | SYSTOLIC BLOOD PRESSURE: 132 MMHG | RESPIRATION RATE: 18 BRPM | OXYGEN SATURATION: 98 % | DIASTOLIC BLOOD PRESSURE: 81 MMHG | TEMPERATURE: 98 F

## 2023-07-19 PROCEDURE — 99232 SBSQ HOSP IP/OBS MODERATE 35: CPT

## 2023-07-19 PROCEDURE — 99239 HOSP IP/OBS DSCHRG MGMT >30: CPT

## 2023-07-19 RX ADMIN — AMLODIPINE BESYLATE 5 MILLIGRAM(S): 2.5 TABLET ORAL at 09:45

## 2023-07-19 RX ADMIN — ENOXAPARIN SODIUM 40 MILLIGRAM(S): 100 INJECTION SUBCUTANEOUS at 09:45

## 2023-07-19 RX ADMIN — Medication 81 MILLIGRAM(S): at 09:45

## 2023-07-19 RX ADMIN — Medication 20 MILLIEQUIVALENT(S): at 09:45

## 2023-07-19 NOTE — DISCHARGE NOTE NURSING/CASE MANAGEMENT/SOCIAL WORK - PATIENT PORTAL LINK FT
You can access the FollowMyHealth Patient Portal offered by [facility] by registering at the following website: [email or web address]. By joining [employer]’s FollowMyHealth portal, you will also be able to view your health information using other applications (apps) compatible with our system.

## 2023-07-19 NOTE — DISCHARGE NOTE NURSING/CASE MANAGEMENT/SOCIAL WORK - NSDCPEFALRISK_GEN_ALL_CORE
For information on Fall & Injury Prevention, visit: [email or web address] OR  [email or web address] OR  https://www.cdc.gov/steadi/patient.html

## 2023-07-19 NOTE — PROGRESS NOTE ADULT - SUBJECTIVE AND OBJECTIVE BOX
Pt feels better, no weakness today, no numbness    MRI brain - no acute findings    ROS: As above, other ROS Negative    MEDICATIONS  (STANDING):  amLODIPine   Tablet 5 milliGRAM(s) Oral daily  aspirin enteric coated 81 milliGRAM(s) Oral daily  atorvastatin 40 milliGRAM(s) Oral at bedtime  enoxaparin Injectable 40 milliGRAM(s) SubCutaneous every 24 hours  potassium chloride    Tablet ER 20 milliEquivalent(s) Oral daily  potassium chloride   Powder 40 milliEquivalent(s) Oral once      Vital Signs Last 24 Hrs  T(C): 36.6 (19 Jul 2023 08:20), Max: 36.7 (18 Jul 2023 16:02)  T(F): 97.8 (19 Jul 2023 08:20), Max: 98.1 (18 Jul 2023 16:02)  HR: 62 (19 Jul 2023 08:20) (62 - 84)  BP: 132/81 (19 Jul 2023 08:20) (129/80 - 132/81)  BP(mean): --  RR: 18 (19 Jul 2023 08:20) (18 - 18)  SpO2: 98% (19 Jul 2023 08:20) (97% - 98%)    Parameters below as of 19 Jul 2023 08:20  Patient On (Oxygen Delivery Method): room air    Neurological exam:  HF: A x O x 3. Appropriately interactive, normal affect. Speech fluent, No Aphasia or paraphasic errors. Naming /repetition intact   CN: SONALI, EOMI, VFF, facial sensation normal, no NLFD, tongue midline, Palate moves equally, SCM equal bilaterally  Motor: No pronator drift, Strength 5/5 in all 4 ext, normal bulk and tone, no tremor, rigidity   Sens: Intact to light touch / PP   Reflexes: Symmetric and normal, downgoing toes b/l  Coord:  No FNFA, dysmetria, COLLIN intact   Gait/Balance: Normal    Vitamin B12, Serum: 637 pg/mL (07.18.23 @ 07:41)     Thyroid Stimulating Hormone, Serum: 3.24 uU/mL                        12.6   7.59  )-----------( 264      ( 18 Jul 2023 07:41 )             37.1     07-18    140  |  108  |  16  ----------------------------<  111<H>  3.3<L>   |  26  |  0.91    Ca    8.9      18 Jul 2023 07:41  Phos  4.9     07-18  Mg     2.2     07-18    TPro  8.4<H>  /  Alb  3.9  /  TBili  0.8  /  DBili  x   /  AST  15  /  ALT  28  /  AlkPhos  65  07-17 07-18 Chol 192 LDL -- HDL 41<L> Trig 102    Radiology report:  - < from: MR Head No Cont (07.18.23 @ 18:45) >  IMPRESSION:    1.  RIGHT CAROTID NECK CIRCULATION:   Intact.    2.  LEFT CAROTID NECK CIRCULATION:    Intact.    3.  VERTEBRAL NECK CIRCULATION:   Intact    4.  ANTERIOR INTRACRANIAL CIRCULATION:     Intact.    5.  POSTERIOR INTRACRANIAL CIRCULATION:   Intact.    6.  BRAIN:    No evidence of acute infarction. No white matter lesions to   suggest demyelination.    
Reason for Admission: Dizziness, paresthesias  History of Present Illness:   49 y/o F with PMH HTN, asthma presents with dizziness. Pt states that she had a few episodes over the last few weeks. States that this morning was the third time this occurred. She woke up and felt dizzy and then started to have numbness and tingling in her hands and feet. Does not have a spinning sensation. Her whole body feels weak. Does not notice any association with movement. She was seen in the ER on 23 and was diagnosed with hypokalemia and discharged home. Denies fevers, chills, chest pain, SOB, abdominal pain, N/V, diarrhea/constipation.      ER course: VSS. Labs: neutrophils 80.7%, K+ 3.4, glucose 119. UA: large ketones. COVID negative.     EKG: pending, f/u result     Imaging:   - CT head: 1. No acute intracranial pathology. If dizziness persists, consider further evaluation via MR imaging to include DWI and ADC mapping techniques, provided there are no contraindications. 2. Inflammatory changes appreciated within the partially imaged right maxillary sinus.  - CXR: no consolidation, no effusion, no pneumothorax (personally reviewed).     Pt is being admitted to telemetry for further management.     REVIEW OF SYSTEMS:  General: NAD, hemodynamically stable, (-)  fever, (-) chills, (-) weakness  HEENT:  Eyes:  No visual loss, blurred vision, double vision or yellow sclerae. Ears, Nose, Throat:  No hearing loss, sneezing, congestion, runny nose or sore throat.  SKIN:  No rash or itching.  CARDIOVASCULAR:  No chest pain, chest pressure or chest discomfort. No palpitations or edema.  RESPIRATORY:  No shortness of breath, cough or sputum.  GASTROINTESTINAL:  No anorexia, nausea, vomiting or diarrhea. No abdominal pain or blood.  NEUROLOGICAL:  No headache, dizziness, syncope, paralysis, ataxia, numbness or tingling in the extremities. No change in bowel or bladder control.  MUSCULOSKELETAL:  No muscle, back pain, joint pain or stiffness.  HEMATOLOGIC:  No anemia, bleeding or bruising.  LYMPHATICS:  No enlarged nodes. No history of splenectomy.  ENDOCRINOLOGIC:  No reports of sweating, cold or heat intolerance. No polyuria or polydipsia.  ALLERGIES:  No history of asthma, hives, eczema or rhinitis.    Physical Exam:   GENERAL APPEARANCE:  NAD, hemodynamically stable  T(C): 36.9 (23 @ 03:05), Max: 37.3 (23 @ 21:15)  HR: 71 (23 @ 03:05) (71 - 77)  BP: 139/83 (23 @ 03:05) (139/83 - 152/93)  RR: 18 (23 @ 03:05) (16 - 19)  SpO2: 99% (23 @ 03:05) (98% - 100%)  Wt(kg): --  HEENT:  Head is normocephalic    Skin:  Warm and dry without any rash   NECK:  Supple without lymphadenopathy.   HEART:  Regular rate and rhythm. normal S1 and S2, No M/R/G  LUNGS:  Good ins/exp effort, no W/R/R/C  ABDOMEN:  Soft, nontender, nondistended with good bowel sounds heard  EXTREMITIES:  Without cyanosis, clubbing or edema.   NEUROLOGICAL:  Gross nonfocal       CBC Full  -  ( 2023 07:41 )  WBC Count : 7.59 K/uL  RBC Count : 4.57 M/uL  Hemoglobin : 12.6 g/dL  Hematocrit : 37.1 %  Platelet Count - Automated : 264 K/uL  Mean Cell Volume : 81.2 fl  Mean Cell Hemoglobin : 27.6 pg  Mean Cell Hemoglobin Concentration : 34.0 gm/dL  Auto Neutrophil # : 4.20 K/uL  Auto Lymphocyte # : 2.61 K/uL  Auto Monocyte # : 0.52 K/uL  Auto Eosinophil # : 0.19 K/uL  Auto Basophil # : 0.04 K/uL  Auto Neutrophil % : 55.3 %  Auto Lymphocyte % : 34.4 %  Auto Monocyte % : 6.9 %  Auto Eosinophil % : 2.5 %  Auto Basophil % : 0.5 %      Urinalysis Basic - ( 2023 18:37 )    Color: Yellow / Appearance: Clear / S.010 / pH: x  Gluc: x / Ketone: Large  / Bili: Negative / Urobili: Negative   Blood: x / Protein: Negative / Nitrite: Negative   Leuk Esterase: Negative / RBC: x / WBC x   Sq Epi: x / Non Sq Epi: x / Bacteria: x      -    140  |  110<H>  |  15  ----------------------------<  119<H>  3.4<L>   |  23  |  0.96    Ca    9.4      2023 17:23  Mg     2.1     -    TPro  8.4<H>  /  Alb  3.9  /  TBili  0.8  /  DBili  x   /  AST  15  /  ALT  28  /  AlkPhos  65  -    49 y/o F presents with dizziness, paresthesias     # Dizziness and paresthesias likely secondary to vertigo vs. hypokalemic periodic paralysis? vs. B12/folate deficiency? (r/o CVA, MS)   - Admit to telemetry    - Not a candidate for TPA; NIH 0 upon my evaluation    - CT head: no acute intracranial pathology  - pending MRI  - Neuro checks Q4H  - Passed dysphagia evaluation -> DASH diet   - Blood glucose checks Q6H   - Start aspirin 81 mg daily and atorvastatin 80 mg daily  - PT evaluation  - Trial of Meclizine, Zofran for nausea   - Neurology consult - Dr. Aranda     # Hypokalemia   - K+ 3.4, monitor closely     #  History of HTN, asthma  - c/w home medications; verified with pt at the bedside    # DVT ppx: Lovenox 40 mg subcutaneous daily   Code status: Full code   Emergency contact: Jeferson Spaulding ()

## 2023-07-25 DIAGNOSIS — R42 DIZZINESS AND GIDDINESS: ICD-10-CM

## 2023-07-25 DIAGNOSIS — I45.81 LONG QT SYNDROME: ICD-10-CM

## 2023-07-25 DIAGNOSIS — J45.909 UNSPECIFIED ASTHMA, UNCOMPLICATED: ICD-10-CM

## 2023-07-25 DIAGNOSIS — R53.1 WEAKNESS: ICD-10-CM

## 2023-07-25 DIAGNOSIS — R20.2 PARESTHESIA OF SKIN: ICD-10-CM

## 2023-07-25 DIAGNOSIS — I10 ESSENTIAL (PRIMARY) HYPERTENSION: ICD-10-CM

## 2023-07-25 DIAGNOSIS — E87.6 HYPOKALEMIA: ICD-10-CM

## 2024-04-24 NOTE — ED ADULT TRIAGE NOTE - BMI (KG/M2)
Patient notified of below information. Verbalized understanding. In agreement with plan. Patient sent to call center to schedule an appointment.    22.6

## 2024-12-01 NOTE — ED PROVIDER NOTE - CROS ED NEURO ALL NEG
[facility]   Physical Therapy Treatment Note     Name: Garima Jordan  [facility] Number: [id number]    Therapy Diagnosis:   Encounter Diagnoses   Name Primary?    Gait abnormality Yes    Decreased range of motion (ROM) of left knee     Decreased strength of lower extremity        Physician: Abundio Rahman PA-C  Surgeon: Dr. Ortega     Visit Date: 11/25/2024  Physician Orders: PT Eval and Treat   Medical Diagnosis from Referral: Complex tear of medial meniscus of left knee as current injury, initial encounter [S83.232A], Sprain of medial collateral ligament of left knee, initial encounter [S83.412A]   Evaluation Date: 10/16/2024  Authorization Period Expiration: 12/31/2024  Plan of Care Expiration: 5/30/2025  Progress Note Due: 11/20/2024  Visit # / Visits authorized: 11/20   FOTO: 1/3  FOTO 1st Follow Up:  FOTO 2nd Follow Up:      PTA Visit #: 0/5     FOTO first follow up:   FOTO second follow up:     Date of Surgery: 10/15/2024  Return to MD: 10/25/2024     Procedure:  1. Left Knee Arthroscopy, with meniscus repair (medial OR lateral) 32663  2. Left Knee  open primary deep MCL repair  3. Left Knee Arthroscopy, with Microfracture 22925 (marrow stimulation procedure)     Post-Op Plan:  Peripheral meniscal repair protocol with repair of the deep MCL.  Toe-touch weight-bearing for 2 weeks followed by progressive partial weight-bearing.  T scope for 4-6 weeks.  Passive range of motion as tolerated.      Precautions: Standard and Weightbearing     Time In: 10:50 am    Time Out: 11:55 am   Total Billable Time: 65 minutes    SUBJECTIVE     Pt reports: She is doing ok, knee feels fine. She got a speeding ticket on the way here so a little overwhelmed right now from that.   She was compliant with home exercise program.  Response to previous treatment: Independent in HEP, improvement in range of motion  Functional change: WBAT     Pain: 1/10  Location: left knee      OBJECTIVE     Objective Measures  "updated at progress report unless specified.     Observation 11/25/2024: Patient ambulates into clinic with t-scope brace unlocked and independently with no assistance.     Range of Motion  Knee    Right AROM Left AROM/AAROM   Flexion 130 degrees  123 degrees / 128 degrees    Extension +2 degrees (hyper) 0 degrees / +2 degrees (hyper)   *following heel prop able to actively achieve +2 degrees hyperextension.     Handheld Dynamometer   Knee Extension Isometric at 60 deg    Right  Left    Trial 1 51.5 lbs  50.5 lbs    Trial 2 53.5 lbs 49.9 lbs   Trial 3 53.5 lbs  45.3 lbs    Average  52.8 lbs  48.6 lbs      7.9% deficit        Treatment   *Pt is 5 week and 6 days s/p as of 11/25/2024.    JEY received the treatments listed below:      therapeutic exercises to develop strength, endurance, ROM, flexibility, posture, and core stabilization for 15 minutes including:    Assessment as above   Pt education on post-op precautions, updated exercises   Heel prop x 5' start of session   Longsitting HS/Gastroc stretch with strap 5 x 20" holds     Not Performed:  Sidelying hip abduction with brace 3 x 10 reps   EOM knee flex/ext AAROM 3 x 8 reps   Russian twists with 6# medball and LE straight 2 x 15 reps   Vertical raises with 6# medball and LE straight 2 x 10 reps     manual therapy techniques: Joint mobilizations and Soft tissue Mobilization were applied to the: Knee for 05 minutes, including:    Patella mobilizations   Fat pad mobilizations   Knee extension hinge mobilizations   EOM knee flexion     neuromuscular re-education activities to improve: Balance, Coordination, Kinesthetic, Sense, Proprioception, and Posture for 35 minutes. The following activities were included:    Lateral band walks BlueTB 3 laps on turf   Squats on fitter board 3 x 10 reps   - 2 finger assist for balance     BFR 60-80% occlusion OKC and CKC respectively with rep scheme: 30/15/15/15  - LAQ 4#   - Runner's position clamshells Yellow   - SL shuttle " "press 37.5#     SL RDL cone tap on blue foam 3 x 8 reps     Not Performed:  NMES: 10" on 50" off   - LAQ isometric at 70 deg x 10'   SL balance with toe touch assistance paloff press BlueTB 2 x 8 reps each  Cone tap into TKE with RedPowerband 2 x 10 reps 3" holds   Standing heel raises 3 x 10 reps   BOSU ball planks with brace and quad set 2 x 1'     therapeutic activities to improve functional performance for 10 minutes, including:    Upright bike x 6' for functional mobility and cardiovascular endurance   DL hip hinge squat to box 22-inch 3 x 8 reps     gait training to improve functional mobility and safety for 00 minutes, including:      Patient Education and Home Exercises     Home Exercises Provided and Patient Education Provided     Education provided:   - PT POC, Prognosis, and HEP   - Early post-op goals: importance of knee extension, quad activation, gait mechanics, edema management. Post-op precautions with weightbearing restrictions   - Signs and Symptoms of DVT and infection    Written Home Exercises Provided: Patient instructed to cont prior HEP. Exercises were reviewed and GARIMA was able to demonstrate them prior to the end of the session.  GARIMA demonstrated good  understanding of the education provided. See EMR under Patient Instructions for exercises provided during therapy sessions    ASSESSMENT     Garima is continuing to progress well with physical therapy. She presented at start of therapy session lacking full terminal knee extension into hyperextension which improved with manual therapy and heel prop. She demonstrates significant improvement in quad strength compared to previous session and continued focus on progressing with exercises and BFR at today's session. She was further challenged with hip strengthening and single limb stability as well and would benefit from further work in future sessions to improve with ultimate goal of eventual return to tumbling requiring significant single limb " stability control and strength. Overall progressing well and will continue to monitor and progress as able.     JEY Is progressing well towards her goals.   Pt prognosis is Good.     Pt will continue to benefit from skilled outpatient physical therapy to address the deficits listed in the problem list box on initial evaluation, provide pt/family education and to maximize pt's level of independence in the home and community environment.     Pt's spiritual, cultural and educational needs considered and pt agreeable to plan of care and goals.     Anticipated barriers to physical therapy: Scheduling     Goals:   Short Term Goals: 4-6 weeks (Progressing, not met)  Patient will be independent in initial HEP to help supplement PT. - MET  Patient will improve knee extension to symmetrical to right to help with gait mechanics.   Patient will improve knee flexion to >/= 90 degrees to help with ADLs. - MET  Patient will improve pain at its worst to </= 5/10 at its worst to help improve quality of life. - MET     Long Term Goals: 18-36 weeks (Progressing, not met)  Patient will be independent in updated HEP to help supplement PT and maintain gains made in PT.   Patient will improve FOTO score to >/= predicted to show improvement in condition.   Patient will improve hip abduction strength to >/= 4+/5 to help with stair negotiation.   Patient will be able to return to jogging without pain to help with recreational activities.   Patient will demonstrate < 10% quad deficit compared to opposite lower extremity in order to help return to tumbling.     PLAN   Plan of care Certification: 10/16/2024 to 5/30/2025.     Continue with current plan of care with progressing within protocol limits.     Irina Cornell, PT, DPT, OCS     Co-Treatment Joe Michael SPT        - - -

## 2025-01-28 NOTE — PATIENT PROFILE ADULT - BILL PAYMENT
"Chief Complaint   Patient presents with    Orders     Image       Initial LMP 05/25/2020 (Exact Date)  Estimated body mass index is 20.02 kg/m  as calculated from the following:    Height as of 12/18/24: 1.651 m (5' 5\").    Weight as of 12/18/24: 54.6 kg (120 lb 4.8 oz).    Patient presents to the clinic using No DME    Is there anyone who you would like to be able to receive your results? No  If yes have patient fill out MATTHEW      "
no

## 2025-06-17 ENCOUNTER — EMERGENCY (EMERGENCY)
Facility: HOSPITAL | Age: 50
LOS: 0 days | Discharge: HOME | End: 2025-06-17
Attending: EMERGENCY MEDICINE
Payer: COMMERCIAL

## 2025-06-17 VITALS
HEART RATE: 76 BPM | OXYGEN SATURATION: 100 % | RESPIRATION RATE: 18 BRPM | TEMPERATURE: 98 F | DIASTOLIC BLOOD PRESSURE: 79 MMHG | SYSTOLIC BLOOD PRESSURE: 147 MMHG

## 2025-06-17 VITALS
TEMPERATURE: 99 F | OXYGEN SATURATION: 100 % | SYSTOLIC BLOOD PRESSURE: 122 MMHG | HEART RATE: 73 BPM | DIASTOLIC BLOOD PRESSURE: 68 MMHG | RESPIRATION RATE: 18 BRPM

## 2025-06-17 DIAGNOSIS — R42 DIZZINESS AND GIDDINESS: ICD-10-CM

## 2025-06-17 DIAGNOSIS — R19.7 DIARRHEA, UNSPECIFIED: ICD-10-CM

## 2025-06-17 DIAGNOSIS — R53.1 WEAKNESS: ICD-10-CM

## 2025-06-17 DIAGNOSIS — R11.0 NAUSEA: ICD-10-CM

## 2025-06-17 DIAGNOSIS — I10 ESSENTIAL (PRIMARY) HYPERTENSION: ICD-10-CM

## 2025-06-17 DIAGNOSIS — R00.2 PALPITATIONS: ICD-10-CM

## 2025-06-17 DIAGNOSIS — R07.9 CHEST PAIN, UNSPECIFIED: ICD-10-CM

## 2025-06-17 DIAGNOSIS — R06.02 SHORTNESS OF BREATH: ICD-10-CM

## 2025-06-17 DIAGNOSIS — Z78.9 OTHER SPECIFIED HEALTH STATUS: Chronic | ICD-10-CM

## 2025-06-17 LAB
ADD ON TEST-SPECIMEN IN LAB: SIGNIFICANT CHANGE UP
ALBUMIN SERPL ELPH-MCNC: 3.7 G/DL — SIGNIFICANT CHANGE UP (ref 3.3–5)
ALP SERPL-CCNC: 58 U/L — SIGNIFICANT CHANGE UP (ref 40–120)
ALT FLD-CCNC: 44 U/L — SIGNIFICANT CHANGE UP (ref 12–78)
ANION GAP SERPL CALC-SCNC: 5 MMOL/L — SIGNIFICANT CHANGE UP (ref 5–17)
AST SERPL-CCNC: 21 U/L — SIGNIFICANT CHANGE UP (ref 15–37)
BASOPHILS # BLD AUTO: 0.07 K/UL — SIGNIFICANT CHANGE UP (ref 0–0.2)
BASOPHILS NFR BLD AUTO: 0.7 % — SIGNIFICANT CHANGE UP (ref 0–2)
BILIRUB SERPL-MCNC: 0.4 MG/DL — SIGNIFICANT CHANGE UP (ref 0.2–1.2)
BUN SERPL-MCNC: 18 MG/DL — SIGNIFICANT CHANGE UP (ref 7–23)
CALCIUM SERPL-MCNC: 9.2 MG/DL — SIGNIFICANT CHANGE UP (ref 8.5–10.1)
CHLORIDE SERPL-SCNC: 109 MMOL/L — HIGH (ref 96–108)
CO2 SERPL-SCNC: 23 MMOL/L — SIGNIFICANT CHANGE UP (ref 22–31)
CREAT SERPL-MCNC: 1.01 MG/DL — SIGNIFICANT CHANGE UP (ref 0.5–1.3)
D DIMER BLD IA.RAPID-MCNC: 159 NG/ML DDU — SIGNIFICANT CHANGE UP
EGFR: 67 ML/MIN/1.73M2 — SIGNIFICANT CHANGE UP
EGFR: 67 ML/MIN/1.73M2 — SIGNIFICANT CHANGE UP
EOSINOPHIL # BLD AUTO: 0.24 K/UL — SIGNIFICANT CHANGE UP (ref 0–0.5)
EOSINOPHIL NFR BLD AUTO: 2.3 % — SIGNIFICANT CHANGE UP (ref 0–6)
GLUCOSE SERPL-MCNC: 153 MG/DL — HIGH (ref 70–99)
HCG SERPL-ACNC: <1 — SIGNIFICANT CHANGE UP
HCT VFR BLD CALC: 40.9 % — SIGNIFICANT CHANGE UP (ref 34.5–45)
HGB BLD-MCNC: 13.6 G/DL — SIGNIFICANT CHANGE UP (ref 11.5–15.5)
IMM GRANULOCYTES # BLD AUTO: 0.03 K/UL — SIGNIFICANT CHANGE UP (ref 0–0.07)
IMM GRANULOCYTES NFR BLD AUTO: 0.3 % — SIGNIFICANT CHANGE UP (ref 0–0.9)
LIDOCAIN IGE QN: 72 U/L — SIGNIFICANT CHANGE UP (ref 13–75)
LYMPHOCYTES # BLD AUTO: 1.51 K/UL — SIGNIFICANT CHANGE UP (ref 1–3.3)
LYMPHOCYTES NFR BLD AUTO: 14.2 % — SIGNIFICANT CHANGE UP (ref 13–44)
MAGNESIUM SERPL-MCNC: 1.9 MG/DL — SIGNIFICANT CHANGE UP (ref 1.6–2.6)
MCHC RBC-ENTMCNC: 27.1 PG — SIGNIFICANT CHANGE UP (ref 27–34)
MCHC RBC-ENTMCNC: 33.3 G/DL — SIGNIFICANT CHANGE UP (ref 32–36)
MCV RBC AUTO: 81.6 FL — SIGNIFICANT CHANGE UP (ref 80–100)
MONOCYTES # BLD AUTO: 0.58 K/UL — SIGNIFICANT CHANGE UP (ref 0–0.9)
MONOCYTES NFR BLD AUTO: 5.5 % — SIGNIFICANT CHANGE UP (ref 2–14)
NEUTROPHILS # BLD AUTO: 8.2 K/UL — HIGH (ref 1.8–7.4)
NEUTROPHILS NFR BLD AUTO: 77 % — SIGNIFICANT CHANGE UP (ref 43–77)
NRBC # BLD AUTO: 0 K/UL — SIGNIFICANT CHANGE UP (ref 0–0)
NRBC # FLD: 0 K/UL — SIGNIFICANT CHANGE UP (ref 0–0)
NRBC BLD AUTO-RTO: 0 /100 WBCS — SIGNIFICANT CHANGE UP (ref 0–0)
NT-PROBNP SERPL-SCNC: 13 PG/ML — SIGNIFICANT CHANGE UP (ref 0–125)
PLATELET # BLD AUTO: 228 K/UL — SIGNIFICANT CHANGE UP (ref 150–400)
PMV BLD: 11 FL — SIGNIFICANT CHANGE UP (ref 7–13)
POTASSIUM SERPL-MCNC: 4.4 MMOL/L — SIGNIFICANT CHANGE UP (ref 3.5–5.3)
POTASSIUM SERPL-SCNC: 4.4 MMOL/L — SIGNIFICANT CHANGE UP (ref 3.5–5.3)
PROT SERPL-MCNC: 7.7 GM/DL — SIGNIFICANT CHANGE UP (ref 6–8.3)
RBC # BLD: 5.01 M/UL — SIGNIFICANT CHANGE UP (ref 3.8–5.2)
RBC # FLD: 12.5 % — SIGNIFICANT CHANGE UP (ref 10.3–14.5)
SODIUM SERPL-SCNC: 137 MMOL/L — SIGNIFICANT CHANGE UP (ref 135–145)
TROPONIN I, HIGH SENSITIVITY RESULT: 3.77 NG/L — SIGNIFICANT CHANGE UP
TSH SERPL-MCNC: 1.67 UU/ML — SIGNIFICANT CHANGE UP (ref 0.34–4.82)
WBC # BLD: 10.63 K/UL — HIGH (ref 3.8–10.5)
WBC # FLD AUTO: 10.63 K/UL — HIGH (ref 3.8–10.5)

## 2025-06-17 PROCEDURE — 80053 COMPREHEN METABOLIC PANEL: CPT

## 2025-06-17 PROCEDURE — 36415 COLL VENOUS BLD VENIPUNCTURE: CPT

## 2025-06-17 PROCEDURE — 84484 ASSAY OF TROPONIN QUANT: CPT

## 2025-06-17 PROCEDURE — 99285 EMERGENCY DEPT VISIT HI MDM: CPT | Mod: 25

## 2025-06-17 PROCEDURE — 71046 X-RAY EXAM CHEST 2 VIEWS: CPT | Mod: 26

## 2025-06-17 PROCEDURE — 83690 ASSAY OF LIPASE: CPT

## 2025-06-17 PROCEDURE — 71046 X-RAY EXAM CHEST 2 VIEWS: CPT

## 2025-06-17 PROCEDURE — 85025 COMPLETE CBC W/AUTO DIFF WBC: CPT

## 2025-06-17 PROCEDURE — 84702 CHORIONIC GONADOTROPIN TEST: CPT

## 2025-06-17 PROCEDURE — 36000 PLACE NEEDLE IN VEIN: CPT

## 2025-06-17 PROCEDURE — 83735 ASSAY OF MAGNESIUM: CPT

## 2025-06-17 PROCEDURE — 85379 FIBRIN DEGRADATION QUANT: CPT

## 2025-06-17 PROCEDURE — 99284 EMERGENCY DEPT VISIT MOD MDM: CPT

## 2025-06-17 PROCEDURE — 84443 ASSAY THYROID STIM HORMONE: CPT

## 2025-06-17 PROCEDURE — 93010 ELECTROCARDIOGRAM REPORT: CPT

## 2025-06-17 PROCEDURE — 83880 ASSAY OF NATRIURETIC PEPTIDE: CPT

## 2025-06-17 PROCEDURE — 93005 ELECTROCARDIOGRAM TRACING: CPT

## 2025-06-17 RX ADMIN — Medication 1000 MILLILITER(S): at 18:19

## 2025-06-17 NOTE — ED ADULT NURSE REASSESSMENT NOTE - NS ED NURSE REASSESS COMMENT FT1
Obtained bedside report from JOLIE Pa at 19:00. Pt resting comfortably in bed, breathing even and unlabored on room air. Pt denies any complaints or concerns at this time. Pt denies chest pain, SOB, or palpitations at this time. VS as charted. POC updated with pt and family member. Safety and comfort measures maintained.

## 2025-06-17 NOTE — ED PROVIDER NOTE - ATTENDING APP SHARED VISIT CONTRIBUTION OF CARE
49 yo female with hx htn, with episode of sharp chest pain earlier while cooking food for her dog.   pain resolved immediately and then had episode of fizziness  symptoms resolved pta  awake and alert  cta b/l, [id number]  abd soft, non tender  no pedal edema/calf tenderness  ekg nsr  labs, cm,

## 2025-06-17 NOTE — ED PROVIDER NOTE - CLINICAL SUMMARY MEDICAL DECISION MAKING FREE TEXT BOX
51 yo female with hx htn, with episode of sharp chest pain earlier while cooking food for her dog.   pain resolved immediately and then had episode of fizziness  symptoms resolved pta  well appearing  cardiac workup, cm

## 2025-06-17 NOTE — ED PROVIDER NOTE - OBJECTIVE STATEMENT
49 y/o F with pmhx of HTN  who presents to the ED complaining of 1 episode of palpitations and stinging chest pain this morning, with subsequent shortness of breath, weakness,  dizziness, nausea after the episode subsided.   Patient states that the episode lasted about 2 minutes before resolved on its own.  patient also endorsing 4 episodes of diarrhea since the episode had occurred.   Denies fever/chills, cough, sore throat, recent sick contacts, abdominal pain, vomiting, back pain, dysuria/hematuria, increased urinary frequency, recent trauma/fall, leg swelling or cramping.  Patient does state that she went to [place] about 2 months ago.  Non-smoker.  No known drug allergies.  Last normal menstrual period 5–28.

## 2025-06-17 NOTE — ED ADULT NURSE NOTE - OBJECTIVE STATEMENT
Pt presents to ED c/o dizziness, chest pain and lightheadedness that started today. States symptoms have resolved. Denies chest pain, shortness of breath, palpitations, diaphoresis, headaches, fevers, dizziness, nausea, vomiting, diarrhea, or urinary symptoms at this time. IV established in right arm with a 20G, labs drawn and sent, call bell in reach, warm blanket provided, bed in lowest position, side rails up x2, MD evaluation in progress. Pt presents to ED c/o dizziness, chest pain and lightheadedness that started today. States symptoms have resolved. Denies chest pain, shortness of breath, palpitations, diaphoresis, headaches, fevers, dizziness, nausea, vomiting, diarrhea, or urinary symptoms at this time. IV established in right arm with a 20G, labs drawn and sent, call bell in reach, warm blanket provided, bed in lowest position, side rails up x2, MD evaluation in progress, pt on telemetry.

## 2025-06-17 NOTE — ED ADULT TRIAGE NOTE - CHIEF COMPLAINT QUOTE
pt presents to ED due to complaints of chest pain lightheadedness and dizziness pt placed on cardiac monitor

## 2025-06-17 NOTE — ED PROVIDER NOTE - NSFOLLOWUPINSTRUCTIONS_ED_ALL_ED_FT
Advance activity as tolerated.  Continue all previously prescribed medications as directed unless otherwise instructed.      Follow-up with a cardiologist- bring copies of your results.        Return to the ER for worsening or persistent symptoms, and/or ANY NEW OR CONCERNING SYMPTOMS.  SEEK IMMEDIATE MEDICAL CARE IF YOU HAVE ANY OF THE FOLLOWING SYMPTOMS: worsening chest pain, coughing up blood, unexplained back/neck/jaw pain, severe abdominal pain, dizziness or lightheadedness, fainting, shortness of breath, sweaty or clammy skin, vomiting, or racing heart beat. These symptoms may represent a serious problem that is an emergency. Do not wait to see if the symptoms will go away. Get medical help right away. Call 911 and do not drive yourself to the hospital.    Heart Palpitations    WHAT YOU NEED TO KNOW:    Heart palpitations are feelings that your heart races, jumps, throbs, or flutters. You may feel extra beats, no beats for a short time, or skipped beats. You may have these feelings in your chest, throat, or neck. They may happen when you are sitting, standing, or lying. Heart palpitations may be frightening, but are usually not caused by a serious problem.    DISCHARGE INSTRUCTIONS:    Call 911 or have someone else call for any of the following:    You have any of the following signs of a heart attack:  Squeezing, pressure, or pain in your chest    You may also have any of the following:  Discomfort or pain in your back, neck, jaw, stomach, or arm    Shortness of breath    Nausea or vomiting    Lightheadedness or a sudden cold sweat    You have any of the following signs of a stroke:  Numbness or drooping on one side of your face    Weakness in an arm or leg    Confusion or difficulty speaking    Dizziness, a severe headache, or vision loss    You faint or lose consciousness.  Return to the emergency department if:    Your palpitations happen more often or get more intense.    Contact your healthcare provider if:    You have new or worsening swelling in your feet or ankles.    You have questions or concerns about your condition or care.  Follow up with your healthcare provider as directed: You may need to follow up with a cardiologist. You may need tests to check for heart problems that cause palpitations. Write down your questions so you remember to ask them during your visits.    Keep a record: Write down when your palpitations start and stop, what you were doing when they started, and your symptoms. Keep track of what you ate or drank within a few hours of your palpitations. Include anything that seemed to help your symptoms, such as lying down or holding your breath. This record will help you and your healthcare provider learn what triggers your palpitations. Bring this record with you to your follow up visits.    Help prevent heart palpitations:    Manage stress and anxiety. Find ways to relax such as listening to music, meditating, or doing yoga. Exercise can also help decrease stress and anxiety. Talk to someone you trust about your stress or anxiety. You can also talk to a therapist.    Get plenty of sleep every night. Ask your healthcare provider how much sleep you need each night.    Do not drink caffeine or alcohol. Caffeine and alcohol can make your palpitations worse. Caffeine is found in soda, coffee, tea, chocolate, and drinks that increase your energy.    Do not smoke. Nicotine and other chemicals in cigarettes and cigars may damage your heart and blood vessels. Ask your healthcare provider for information if you currently smoke and need help to quit. E-cigarettes or smokeless tobacco still contain nicotine. Talk to your healthcare provider before you use these products.    Do not use illegal drugs. Talk to your healthcare provider if you use illegal drugs and want help to quit.

## 2025-06-17 NOTE — ED PROVIDER NOTE - PROGRESS NOTE DETAILS
PIERRE Lorenz: Labs reviewed, Unremarkable.  Trope negative.  TSH and BMP within normal limits.  D-dimer negative.  Chest x-ray with no acute findings.  Patient without any additional episodes in the ED.  Cardiac monitor without any additional events.  EKG with no ischemic changes.  Spoke with patient regarding results.  Patient understands to follow-up as an outpatient with cardiology for further evaluation.  Strict return precautions given.  Stable for discharge at this time.

## 2025-06-17 NOTE — ED PROVIDER NOTE - PHYSICAL EXAMINATION
General: Well appearing in no acute distress, alert and cooperative  Head: Normocephalic, atraumatic  Eyes: PERRLA, no conjunctival injection, no scleral icterus, EOMI  Neck: Soft and supple, full ROM without pain, no midline tenderness  Cardiac: Regular rate and regular rhythm, no murmurs  Resp: Unlabored respiratory effort, lungs CTAB, speaking in full sentences, no wheezes  Abd: Soft, non-tender, non-distended, no guarding or rebound tenderness.   No CVA tenderness bilaterally  MSK: Spine midline and non-tender.  no lower extremity edema.  No calf tenderness to palpation bilaterally  Skin: Warm and dry, no rashes/abrasions/lacerations  Neuro: AO x 3, moves all extremities symmetrically, Motor strength 5/5 bilaterally UE and LE, sensation grossly intact.  normal gait.  Speech is fluent and appropriate.  CN III to XII intact.  No facial droop or pronator drift.

## 2025-06-17 NOTE — ED PROVIDER NOTE - PATIENT PORTAL LINK FT
You can access the FollowMyHealth Patient Portal offered by [facility] by registering at the following website: [email or web address]. By joining [employer]’s FollowMyHealth portal, you will also be able to view your health information using other applications (apps) compatible with our system.

## 2025-06-18 PROBLEM — I10 ESSENTIAL (PRIMARY) HYPERTENSION: Chronic | Status: ACTIVE | Noted: 2023-07-18

## 2025-06-18 PROBLEM — J45.909 UNSPECIFIED ASTHMA, UNCOMPLICATED: Chronic | Status: ACTIVE | Noted: 2023-07-18

## 2025-06-24 ENCOUNTER — APPOINTMENT (OUTPATIENT)
Dept: CARDIOLOGY | Facility: CLINIC | Age: 50
End: 2025-06-24
Payer: COMMERCIAL

## 2025-06-24 VITALS
WEIGHT: 140 LBS | DIASTOLIC BLOOD PRESSURE: 80 MMHG | HEART RATE: 76 BPM | RESPIRATION RATE: 16 BRPM | BODY MASS INDEX: 23.32 KG/M2 | SYSTOLIC BLOOD PRESSURE: 130 MMHG | HEIGHT: 65 IN

## 2025-06-24 PROBLEM — R06.02 SOB (SHORTNESS OF BREATH): Status: ACTIVE | Noted: 2025-06-24

## 2025-06-24 PROBLEM — R07.9 CHEST PAIN, UNSPECIFIED TYPE: Status: ACTIVE | Noted: 2025-06-24

## 2025-06-24 PROBLEM — I10 HYPERTENSION, UNSPECIFIED TYPE: Status: ACTIVE | Noted: 2025-06-24

## 2025-06-24 PROBLEM — Z82.49 FAMILY HISTORY OF HYPERTENSION: Status: ACTIVE | Noted: 2025-06-24

## 2025-06-24 PROBLEM — R42 LIGHTHEADEDNESS: Status: ACTIVE | Noted: 2025-06-24

## 2025-06-24 PROCEDURE — 93000 ELECTROCARDIOGRAM COMPLETE: CPT

## 2025-06-24 PROCEDURE — 99204 OFFICE O/P NEW MOD 45 MIN: CPT

## 2025-06-24 RX ORDER — AMLODIPINE BESYLATE 5 MG/1
5 TABLET ORAL DAILY
Qty: 90 | Refills: 1 | Status: ACTIVE | COMMUNITY

## 2025-06-24 RX ORDER — RAMIPRIL 5 MG/1
5 CAPSULE ORAL DAILY
Refills: 0 | Status: ACTIVE | COMMUNITY

## 2025-06-24 RX ORDER — PROPRANOLOL HYDROCHLORIDE 40 MG/1
40 TABLET ORAL DAILY
Refills: 0 | Status: ACTIVE | COMMUNITY

## 2025-07-12 ENCOUNTER — LABORATORY RESULT (OUTPATIENT)
Age: 50
End: 2025-07-12

## 2025-07-15 ENCOUNTER — NON-APPOINTMENT (OUTPATIENT)
Age: 50
End: 2025-07-15

## 2025-07-15 ENCOUNTER — EMERGENCY (EMERGENCY)
Facility: HOSPITAL | Age: 50
LOS: 1 days | End: 2025-07-15
Attending: EMERGENCY MEDICINE
Payer: COMMERCIAL

## 2025-07-15 ENCOUNTER — APPOINTMENT (OUTPATIENT)
Dept: CARDIOLOGY | Facility: CLINIC | Age: 50
End: 2025-07-15

## 2025-07-15 VITALS
DIASTOLIC BLOOD PRESSURE: 89 MMHG | RESPIRATION RATE: 16 BRPM | HEIGHT: 65 IN | WEIGHT: 142.2 LBS | OXYGEN SATURATION: 100 % | TEMPERATURE: 98 F | SYSTOLIC BLOOD PRESSURE: 149 MMHG | HEART RATE: 89 BPM

## 2025-07-15 DIAGNOSIS — Z78.9 OTHER SPECIFIED HEALTH STATUS: Chronic | ICD-10-CM

## 2025-07-15 LAB
ALBUMIN SERPL ELPH-MCNC: 4.1 G/DL — SIGNIFICANT CHANGE UP (ref 3.3–5.2)
ALP SERPL-CCNC: 63 U/L — SIGNIFICANT CHANGE UP (ref 40–120)
ALT FLD-CCNC: 32 U/L — SIGNIFICANT CHANGE UP
ANION GAP SERPL CALC-SCNC: 13 MMOL/L — SIGNIFICANT CHANGE UP (ref 5–17)
AST SERPL-CCNC: 44 U/L — HIGH
BASOPHILS # BLD AUTO: 0.07 K/UL — SIGNIFICANT CHANGE UP (ref 0–0.2)
BASOPHILS NFR BLD AUTO: 0.9 % — SIGNIFICANT CHANGE UP (ref 0–2)
BILIRUB SERPL-MCNC: 0.4 MG/DL — SIGNIFICANT CHANGE UP (ref 0.4–2)
BUN SERPL-MCNC: 16.7 MG/DL — SIGNIFICANT CHANGE UP (ref 8–20)
CALCIUM SERPL-MCNC: 9.7 MG/DL — SIGNIFICANT CHANGE UP (ref 8.4–10.5)
CHLORIDE SERPL-SCNC: 101 MMOL/L — SIGNIFICANT CHANGE UP (ref 96–108)
CO2 SERPL-SCNC: 20 MMOL/L — LOW (ref 22–29)
CREAT SERPL-MCNC: 0.79 MG/DL — SIGNIFICANT CHANGE UP (ref 0.5–1.3)
D DIMER BLD IA.RAPID-MCNC: 168 NG/ML DDU — SIGNIFICANT CHANGE UP
EGFR: 90 ML/MIN/1.73M2 — SIGNIFICANT CHANGE UP
EGFR: 90 ML/MIN/1.73M2 — SIGNIFICANT CHANGE UP
EOSINOPHIL # BLD AUTO: 0.12 K/UL — SIGNIFICANT CHANGE UP (ref 0–0.5)
EOSINOPHIL NFR BLD AUTO: 1.5 % — SIGNIFICANT CHANGE UP (ref 0–6)
GLUCOSE SERPL-MCNC: 114 MG/DL — HIGH (ref 70–99)
HCG SERPL-ACNC: <4 MIU/ML — SIGNIFICANT CHANGE UP
HCT VFR BLD CALC: 39.9 % — SIGNIFICANT CHANGE UP (ref 34.5–45)
HGB BLD-MCNC: 13.5 G/DL — SIGNIFICANT CHANGE UP (ref 11.5–15.5)
IMM GRANULOCYTES # BLD AUTO: 0.02 K/UL — SIGNIFICANT CHANGE UP (ref 0–0.07)
IMM GRANULOCYTES NFR BLD AUTO: 0.2 % — SIGNIFICANT CHANGE UP (ref 0–0.9)
IRON SATN MFR SERPL: 116 UG/DL — SIGNIFICANT CHANGE UP (ref 37–145)
IRON SATN MFR SERPL: 31 % — SIGNIFICANT CHANGE UP (ref 14–50)
LIDOCAIN IGE QN: 63 U/L — HIGH (ref 22–51)
LYMPHOCYTES # BLD AUTO: 1.61 K/UL — SIGNIFICANT CHANGE UP (ref 1–3.3)
LYMPHOCYTES NFR BLD AUTO: 19.9 % — SIGNIFICANT CHANGE UP (ref 13–44)
MAGNESIUM SERPL-MCNC: 1.9 MG/DL — SIGNIFICANT CHANGE UP (ref 1.6–2.6)
MCHC RBC-ENTMCNC: 26.9 PG — LOW (ref 27–34)
MCHC RBC-ENTMCNC: 33.8 G/DL — SIGNIFICANT CHANGE UP (ref 32–36)
MCV RBC AUTO: 79.5 FL — LOW (ref 80–100)
MONOCYTES # BLD AUTO: 0.53 K/UL — SIGNIFICANT CHANGE UP (ref 0–0.9)
MONOCYTES NFR BLD AUTO: 6.5 % — SIGNIFICANT CHANGE UP (ref 2–14)
NEUTROPHILS # BLD AUTO: 5.76 K/UL — SIGNIFICANT CHANGE UP (ref 1.8–7.4)
NEUTROPHILS NFR BLD AUTO: 71 % — SIGNIFICANT CHANGE UP (ref 43–77)
NRBC # BLD AUTO: 0 K/UL — SIGNIFICANT CHANGE UP (ref 0–0)
NRBC # FLD: 0 K/UL — SIGNIFICANT CHANGE UP (ref 0–0)
NRBC BLD AUTO-RTO: 0 /100 WBCS — SIGNIFICANT CHANGE UP (ref 0–0)
NT-PROBNP SERPL-SCNC: <36 PG/ML — SIGNIFICANT CHANGE UP (ref 0–300)
PLATELET # BLD AUTO: 244 K/UL — SIGNIFICANT CHANGE UP (ref 150–400)
PMV BLD: 11.6 FL — SIGNIFICANT CHANGE UP (ref 7–13)
POTASSIUM SERPL-MCNC: 4.7 MMOL/L — SIGNIFICANT CHANGE UP (ref 3.5–5.3)
POTASSIUM SERPL-SCNC: 4.7 MMOL/L — SIGNIFICANT CHANGE UP (ref 3.5–5.3)
PROT SERPL-MCNC: 7.9 G/DL — SIGNIFICANT CHANGE UP (ref 6.6–8.7)
RBC # BLD: 5.02 M/UL — SIGNIFICANT CHANGE UP (ref 3.8–5.2)
RBC # FLD: 12.2 % — SIGNIFICANT CHANGE UP (ref 10.3–14.5)
SODIUM SERPL-SCNC: 134 MMOL/L — LOW (ref 135–145)
TIBC SERPL-MCNC: 376 UG/DL — SIGNIFICANT CHANGE UP (ref 220–430)
TRANSFERRIN SERPL-MCNC: 263 MG/DL — SIGNIFICANT CHANGE UP (ref 192–382)
TROPONIN T, HIGH SENSITIVITY RESULT: <6 NG/L — SIGNIFICANT CHANGE UP (ref 0–51)
TSH SERPL-MCNC: 2.14 UIU/ML — SIGNIFICANT CHANGE UP (ref 0.27–4.2)
WBC # BLD: 8.11 K/UL — SIGNIFICANT CHANGE UP (ref 3.8–10.5)
WBC # FLD AUTO: 8.11 K/UL — SIGNIFICANT CHANGE UP (ref 3.8–10.5)

## 2025-07-15 PROCEDURE — 85379 FIBRIN DEGRADATION QUANT: CPT

## 2025-07-15 PROCEDURE — 93306 TTE W/DOPPLER COMPLETE: CPT

## 2025-07-15 PROCEDURE — 93010 ELECTROCARDIOGRAM REPORT: CPT

## 2025-07-15 PROCEDURE — 84443 ASSAY THYROID STIM HORMONE: CPT

## 2025-07-15 PROCEDURE — 84702 CHORIONIC GONADOTROPIN TEST: CPT

## 2025-07-15 PROCEDURE — 99285 EMERGENCY DEPT VISIT HI MDM: CPT | Mod: 25

## 2025-07-15 PROCEDURE — 86753 PROTOZOA ANTIBODY NOS: CPT

## 2025-07-15 PROCEDURE — 86618 LYME DISEASE ANTIBODY: CPT

## 2025-07-15 PROCEDURE — 71045 X-RAY EXAM CHEST 1 VIEW: CPT

## 2025-07-15 PROCEDURE — 36415 COLL VENOUS BLD VENIPUNCTURE: CPT

## 2025-07-15 PROCEDURE — 99284 EMERGENCY DEPT VISIT MOD MDM: CPT

## 2025-07-15 PROCEDURE — 84466 ASSAY OF TRANSFERRIN: CPT

## 2025-07-15 PROCEDURE — 83735 ASSAY OF MAGNESIUM: CPT

## 2025-07-15 PROCEDURE — 83690 ASSAY OF LIPASE: CPT

## 2025-07-15 PROCEDURE — 83550 IRON BINDING TEST: CPT

## 2025-07-15 PROCEDURE — 84484 ASSAY OF TROPONIN QUANT: CPT

## 2025-07-15 PROCEDURE — 86666 EHRLICHIA ANTIBODY: CPT

## 2025-07-15 PROCEDURE — 83880 ASSAY OF NATRIURETIC PEPTIDE: CPT

## 2025-07-15 PROCEDURE — 83540 ASSAY OF IRON: CPT

## 2025-07-15 PROCEDURE — 93005 ELECTROCARDIOGRAM TRACING: CPT

## 2025-07-15 PROCEDURE — 71045 X-RAY EXAM CHEST 1 VIEW: CPT | Mod: 26

## 2025-07-15 PROCEDURE — 80053 COMPREHEN METABOLIC PANEL: CPT

## 2025-07-15 PROCEDURE — 85025 COMPLETE CBC W/AUTO DIFF WBC: CPT

## 2025-07-15 RX ORDER — HYDROXYZINE HYDROCHLORIDE 25 MG/1
1 TABLET, FILM COATED ORAL
Qty: 15 | Refills: 0
Start: 2025-07-15 | End: 2025-07-19

## 2025-07-15 RX ORDER — HYDROXYZINE HYDROCHLORIDE 25 MG/1
50 TABLET, FILM COATED ORAL ONCE
Refills: 0 | Status: COMPLETED | OUTPATIENT
Start: 2025-07-15 | End: 2025-07-15

## 2025-07-15 RX ADMIN — HYDROXYZINE HYDROCHLORIDE 50 MILLIGRAM(S): 25 TABLET, FILM COATED ORAL at 16:35

## 2025-07-15 RX ADMIN — Medication 1000 MILLILITER(S): at 16:33

## 2025-07-15 NOTE — ED PROVIDER NOTE - PATIENT PORTAL LINK FT
You can access the FollowMyHealth Patient Portal offered by [facility] by registering at the following website: [email or web address]. By joining [employer]’s FollowMyHealth portal, you will also be able to view your health information using other applications (apps) compatible with our system.

## 2025-07-15 NOTE — ED PROVIDER NOTE - CLINICAL SUMMARY MEDICAL DECISION MAKING FREE TEXT BOX
Patient denies any past medical history with dizziness will evaluate labs IV fluids TSH and iron studies IV fluids CAT scan of head and reassess final disposition

## 2025-07-15 NOTE — ED PROVIDER NOTE - NSFOLLOWUPINSTRUCTIONS_ED_ALL_ED_FT
hydroxyzine 50mg by mouth every 8 hours    follow up with doctor in 3 - 5 days  return to ed with worse symptom

## 2025-07-15 NOTE — ED PROVIDER NOTE - OBJECTIVE STATEMENT
48-year-old female no significant past medical history comes to the ED with feeling dizzy generalized weakness.  Patient seen by cardiologist who states patient also describes numbness upper extremity and lower extremity numbness and tingling.  Patient states had similar symptoms before workup including cardiac MRI of brain.  Patient states symptoms are improved while in the ED.  Patient denies any nausea vomiting fever.

## 2025-07-19 LAB
A PHAGOCYTOPH IGG TITR SER IF: SIGNIFICANT CHANGE UP
B BURGDOR AB SER QL IA: 0.18 IV — SIGNIFICANT CHANGE UP
B MICROTI IGG TITR SER: SIGNIFICANT CHANGE UP
E CHAFFEENSIS IGG TITR SER IF: SIGNIFICANT CHANGE UP

## 2025-08-19 PROCEDURE — 93228 REMOTE 30 DAY ECG REV/REPORT: CPT

## 2025-09-17 ENCOUNTER — LABORATORY RESULT (OUTPATIENT)
Age: 50
End: 2025-09-17